# Patient Record
Sex: FEMALE | Race: WHITE | NOT HISPANIC OR LATINO
[De-identification: names, ages, dates, MRNs, and addresses within clinical notes are randomized per-mention and may not be internally consistent; named-entity substitution may affect disease eponyms.]

---

## 2020-01-03 ENCOUNTER — LABORATORY RESULT (OUTPATIENT)
Age: 85
End: 2020-01-03

## 2020-01-03 ENCOUNTER — APPOINTMENT (OUTPATIENT)
Dept: INTERNAL MEDICINE | Facility: CLINIC | Age: 85
End: 2020-01-03
Payer: MEDICARE

## 2020-01-03 VITALS
BODY MASS INDEX: 25.15 KG/M2 | DIASTOLIC BLOOD PRESSURE: 58 MMHG | TEMPERATURE: 98 F | WEIGHT: 121.46 LBS | SYSTOLIC BLOOD PRESSURE: 114 MMHG | HEIGHT: 58.27 IN | OXYGEN SATURATION: 95 % | HEART RATE: 72 BPM

## 2020-01-03 DIAGNOSIS — Z23 ENCOUNTER FOR IMMUNIZATION: ICD-10-CM

## 2020-01-03 PROCEDURE — G0444 DEPRESSION SCREEN ANNUAL: CPT

## 2020-01-03 PROCEDURE — 90670 PCV13 VACCINE IM: CPT

## 2020-01-03 PROCEDURE — G0009: CPT

## 2020-01-03 PROCEDURE — G0439: CPT | Mod: 25

## 2020-01-03 PROCEDURE — 99202 OFFICE O/P NEW SF 15 MIN: CPT | Mod: 25

## 2020-01-03 NOTE — HEALTH RISK ASSESSMENT
[Good] : ~his/her~  mood as  good [FreeTextEntry1] : Overall health maintenanc3 [] : No [No] : No [No falls in past year] : Patient reported no falls in the past year [0] : 2) Feeling down, depressed, or hopeless: Not at all (0) [de-identified] : none [de-identified] : none [Change in mental status noted] : No change in mental status noted [Language] : denies difficulty with language [Behavior] : denies difficulty with behavior [Learning/Retaining New Information] : denies difficulty learning/retaining new information [Handling Complex Tasks] : denies difficulty handling complex tasks [Reasoning] : denies difficulty with reasoning [Spatial Ability and Orientation] : denies difficulty with spatial ability and orientation [None] : None [Alone] : lives alone [] :  [Feels Safe at Home] : Feels safe at home [Fully functional (bathing, dressing, toileting, transferring, walking, feeding)] : Fully functional (bathing, dressing, toileting, transferring, walking, feeding) [Fully functional (using the telephone, shopping, preparing meals, housekeeping, doing laundry, using] : Fully functional and needs no help or supervision to perform IADLs (using the telephone, shopping, preparing meals, housekeeping, doing laundry, using transportation, managing medications and managing finances) [Reports changes in vision] : Reports no changes in vision [Reports changes in hearing] : Reports changes in hearing [Reports normal functional visual acuity (ie: able to read med bottle)] : Reports normal functional visual acuity [Reports changes in dental health] : Reports no changes in dental health [Smoke Detector] : smoke detector [Carbon Monoxide Detector] : carbon monoxide detector [Guns at Home] : guns at home [Safety elements used in home] : safety elements used in home [Seat Belt] :  uses seat belt [Sunscreen] : uses sunscreen [Travel to Developing Areas] : does not  travel to developing areas [TB Exposure] : is not being exposed to tuberculosis [Caregiver Concerns] : does not have caregiver concerns [MammogramComments] : n/a [PapSmearComments] : n/a [BoneDensityComments] : n/a [ColonoscopyComments] : n/a [de-identified] : Works at [employer] [de-identified] : Has seen ent [Designated Healthcare Proxy] : Designated healthcare proxy [AdvancecareDate] : 1/2020

## 2020-01-03 NOTE — REASON FOR VISIT
[Annual Wellness Visit] : an annual wellness visit [FreeTextEntry1] : Subsequent medicare wellness visit

## 2020-01-03 NOTE — HISTORY OF PRESENT ILLNESS
[FreeTextEntry1] : 89 year old female presents for subsequent medicare wellness visit and chronic disease management [de-identified] : Patient overall feels well, has chronic shoulder arthritis, saw orthopedist and did PT in the past. HTN, well controlled, had pneumovax 5 years ago. interested in prevnar vaccine. Denies any chest pain, tightness, shortness of breath or palpitations. Denies any falls.

## 2020-01-03 NOTE — ASSESSMENT
[FreeTextEntry1] : 1) Medicare annual wellness visit: blood work done in the office today, discussed fall risk precautions. Prevnar administered today\par 2) HTN: stable, continue current management\par 3) Shoulder arthritis: advised to continue with PT, improve strength and rom

## 2020-01-07 LAB
25(OH)D3 SERPL-MCNC: 44.4 NG/ML
ALBUMIN SERPL ELPH-MCNC: 4.7 G/DL
ALP BLD-CCNC: 67 U/L
ALT SERPL-CCNC: 55 U/L
ANION GAP SERPL CALC-SCNC: 14 MMOL/L
APPEARANCE: ABNORMAL
APPEARANCE: ABNORMAL
AST SERPL-CCNC: 41 U/L
BACTERIA: ABNORMAL
BASOPHILS # BLD AUTO: 0.08 K/UL
BASOPHILS NFR BLD AUTO: 1 %
BILIRUB SERPL-MCNC: 0.4 MG/DL
BILIRUBIN URINE: NEGATIVE
BILIRUBIN URINE: NEGATIVE
BLOOD URINE: NEGATIVE
BLOOD URINE: NEGATIVE
BUN SERPL-MCNC: 28 MG/DL
CALCIUM SERPL-MCNC: 9.9 MG/DL
CHLORIDE SERPL-SCNC: 101 MMOL/L
CHOLEST SERPL-MCNC: 225 MG/DL
CHOLEST/HDLC SERPL: 3.9 RATIO
CO2 SERPL-SCNC: 26 MMOL/L
COLOR: YELLOW
COLOR: YELLOW
CREAT SERPL-MCNC: 1.04 MG/DL
EOSINOPHIL # BLD AUTO: 0.14 K/UL
EOSINOPHIL NFR BLD AUTO: 1.7 %
ESTIMATED AVERAGE GLUCOSE: 108 MG/DL
GLUCOSE QUALITATIVE U: NEGATIVE
GLUCOSE QUALITATIVE U: NEGATIVE
GLUCOSE SERPL-MCNC: 113 MG/DL
HBA1C MFR BLD HPLC: 5.4 %
HCT VFR BLD CALC: 42.7 %
HDLC SERPL-MCNC: 58 MG/DL
HGB BLD-MCNC: 14 G/DL
HYALINE CASTS: 0 /LPF
IMM GRANULOCYTES NFR BLD AUTO: 0.2 %
KETONES URINE: NEGATIVE
KETONES URINE: NEGATIVE
LDLC SERPL CALC-MCNC: 132 MG/DL
LEUKOCYTE ESTERASE URINE: ABNORMAL
LEUKOCYTE ESTERASE URINE: ABNORMAL
LYMPHOCYTES # BLD AUTO: 1.63 K/UL
LYMPHOCYTES NFR BLD AUTO: 19.7 %
MAN DIFF?: NORMAL
MCHC RBC-ENTMCNC: 31.1 PG
MCHC RBC-ENTMCNC: 32.8 GM/DL
MCV RBC AUTO: 94.9 FL
MICROSCOPIC-UA: NORMAL
MONOCYTES # BLD AUTO: 0.55 K/UL
MONOCYTES NFR BLD AUTO: 6.6 %
NEUTROPHILS # BLD AUTO: 5.87 K/UL
NEUTROPHILS NFR BLD AUTO: 70.8 %
NITRITE URINE: POSITIVE
NITRITE URINE: POSITIVE
PH URINE: 6.5
PH URINE: 6.5
PLATELET # BLD AUTO: 291 K/UL
POTASSIUM SERPL-SCNC: 4.6 MMOL/L
PROT SERPL-MCNC: 6.8 G/DL
PROTEIN URINE: NEGATIVE
PROTEIN URINE: NEGATIVE
RBC # BLD: 4.5 M/UL
RBC # FLD: 13.1 %
RED BLOOD CELLS URINE: 3 /HPF
SODIUM SERPL-SCNC: 141 MMOL/L
SPECIFIC GRAVITY URINE: 1.01
SPECIFIC GRAVITY URINE: 1.01
SQUAMOUS EPITHELIAL CELLS: 2 /HPF
TRIGL SERPL-MCNC: 173 MG/DL
TSH SERPL-ACNC: 3.26 UIU/ML
UROBILINOGEN URINE: NORMAL
UROBILINOGEN URINE: NORMAL
VIT B12 SERPL-MCNC: 630 PG/ML
WBC # FLD AUTO: 8.29 K/UL
WHITE BLOOD CELLS URINE: 134 /HPF

## 2020-01-08 ENCOUNTER — MESSAGE (OUTPATIENT)
Age: 85
End: 2020-01-08

## 2020-01-15 ENCOUNTER — APPOINTMENT (OUTPATIENT)
Dept: INTERNAL MEDICINE | Facility: CLINIC | Age: 85
End: 2020-01-15
Payer: MEDICARE

## 2020-01-15 PROCEDURE — 36415 COLL VENOUS BLD VENIPUNCTURE: CPT

## 2020-01-17 LAB
ALBUMIN SERPL ELPH-MCNC: 4.5 G/DL
ALP BLD-CCNC: 69 U/L
ALT SERPL-CCNC: 47 U/L
AST SERPL-CCNC: 41 U/L
BILIRUB DIRECT SERPL-MCNC: 0.2 MG/DL
BILIRUB INDIRECT SERPL-MCNC: 0.4 MG/DL
BILIRUB SERPL-MCNC: 0.6 MG/DL
FERRITIN SERPL-MCNC: 280 NG/ML
HAV IGM SER QL: NONREACTIVE
HBV CORE IGM SER QL: NONREACTIVE
HBV SURFACE AG SER QL: NONREACTIVE
HCV AB SER QL: NONREACTIVE
HCV S/CO RATIO: 0.1 S/CO
IRON SATN MFR SERPL: 25 %
IRON SERPL-MCNC: 79 UG/DL
MITOCHONDRIA AB SER IF-ACNC: NORMAL
PROT SERPL-MCNC: 6.5 G/DL
SMOOTH MUSCLE AB SER QL IF: NORMAL
TIBC SERPL-MCNC: 320 UG/DL
UIBC SERPL-MCNC: 241 UG/DL

## 2020-01-23 LAB
ANA PAT FLD IF-IMP: ABNORMAL
ANA SER IF-ACNC: ABNORMAL

## 2020-02-20 ENCOUNTER — INPATIENT (INPATIENT)
Facility: HOSPITAL | Age: 85
LOS: 5 days | Discharge: HOME | DRG: 563 | End: 2020-02-26
Attending: HOSPITALIST | Admitting: STUDENT IN AN ORGANIZED HEALTH CARE EDUCATION/TRAINING PROGRAM
Payer: MEDICARE

## 2020-02-20 VITALS
HEIGHT: 58 IN | HEART RATE: 93 BPM | WEIGHT: 128.09 LBS | OXYGEN SATURATION: 97 % | RESPIRATION RATE: 20 BRPM | TEMPERATURE: 98 F | DIASTOLIC BLOOD PRESSURE: 82 MMHG | SYSTOLIC BLOOD PRESSURE: 145 MMHG

## 2020-02-20 PROCEDURE — 73060 X-RAY EXAM OF HUMERUS: CPT | Mod: 26,RT

## 2020-02-20 PROCEDURE — 73030 X-RAY EXAM OF SHOULDER: CPT | Mod: 26,RT

## 2020-02-20 PROCEDURE — 73502 X-RAY EXAM HIP UNI 2-3 VIEWS: CPT | Mod: 26,RT

## 2020-02-20 PROCEDURE — 99285 EMERGENCY DEPT VISIT HI MDM: CPT

## 2020-02-20 PROCEDURE — 71045 X-RAY EXAM CHEST 1 VIEW: CPT | Mod: 26

## 2020-02-20 PROCEDURE — 73552 X-RAY EXAM OF FEMUR 2/>: CPT | Mod: 26,RT

## 2020-02-20 PROCEDURE — 93010 ELECTROCARDIOGRAM REPORT: CPT

## 2020-02-20 RX ORDER — ACETAMINOPHEN 500 MG
975 TABLET ORAL ONCE
Refills: 0 | Status: COMPLETED | OUTPATIENT
Start: 2020-02-20 | End: 2020-02-20

## 2020-02-20 RX ADMIN — Medication 975 MILLIGRAM(S): at 21:40

## 2020-02-20 RX ADMIN — Medication 975 MILLIGRAM(S): at 20:39

## 2020-02-20 NOTE — ED PROVIDER NOTE - LOCATION
no ttp to hip/thigh, full ROM of all joints, neurovasc intact right mid-humeral swelling w overlying ecchymosis and ttp; no other obvious deformities or ttp (to shoulder, elbow, forearm, wrist or hand; severe pain w/attempted flexion of arm at elbow, full ROM otherwise; neurovasc intact throughout

## 2020-02-20 NOTE — ED PROVIDER NOTE - ATTENDING CONTRIBUTION TO CARE
I performed a history and physical exam of the patient and discussed their management with the resident and /or advanced care provider. I reviewed the resident and /or ACP's note and agree with the documented findings and plan of care. My medical decision making and observations are found above.  awake alert, moving all 4

## 2020-02-20 NOTE — ED PROVIDER NOTE - PROGRESS NOTE DETAILS
Elizabeth Goldberger PGY-3: xray w comminuted, impacted proximal humerus fx. Ortho paloma, pt aware Elizabeth Goldberger PGY-3: no call back from ortho, repaged Elizabeth Goldberger PGY-3: seen by ortho who placed sling and recommend NWB w/ outpt Follow up - no acute surgical intervention. Pt, normally able to ambulate unassisted, is able to stand but is having difficulty moving due to pain/sling on right + chronic severe arthritis in LUE. Son expressing concern about pt going home w limited mobility. Will plan to admit to med

## 2020-02-20 NOTE — ED PROVIDER NOTE - SHIFT CHANGE DETAILS
Deepa Farias MD - Attending Physician: Mechanical fall, Humerus fracture. Awaiting Ortho eval for dispo planning

## 2020-02-20 NOTE — ED ADULT NURSE NOTE - OBJECTIVE STATEMENT
89 y/o female PMH HTN, mastectomy, hysterectomy presents to ED reporting R shoulder pain. Pt reports falling from chair onto R side. Pt denies LOC, hitting head or blood thinner usage. Pt denies dizziness or lightheadedness prior to fall. Pt also reports R hip pain, no lesions, lacerations or ecchymosis noted. Pt reports taking alleve prior to coming into ED. On exam, AOx3, speaking in complete sentences. Deformity noted to R arm under shoulder, no ecchymosis, lesions or lacerations. PERRL 3mm, able to move all extremities. Lung sounds CTA, NAD. Abdomen soft, non-tender, non-distended, normoactive bowel sounds. Pt denies fever/chills, CP, SOB, n/v/d, fever/chills. Family at bedside. Seen and evaluated by MD. Awaiting imaging at this time. 89 y/o female PMH HTN, mastectomy, hysterectomy presents to ED reporting R shoulder pain. Pt reports falling from chair onto R side. Pt denies LOC, hitting head or blood thinner usage. Pt denies dizziness or lightheadedness prior to fall. Pt also reports R hip pain, no lesions, lacerations or ecchymosis noted. Pt reports taking Alleve prior to coming into ED. On exam, AOx3, speaking in complete sentences. Deformity noted to R arm under shoulder, no ecchymosis, lesions or lacerations. +2 peripheral pulses, <2 seconds capillary refill. PERRL 3mm, able to move all extremities. Lung sounds CTA, NAD. Abdomen soft, non-tender, non-distended, normoactive bowel sounds. Pt denies fever/chills, CP, SOB, n/v/d, fever/chills. Family at bedside. Seen and evaluated by MD. Awaiting imaging at this time.

## 2020-02-20 NOTE — ED PROVIDER NOTE - OBJECTIVE STATEMENT
89f w hx breast ca s/p b/l mastectomy and (prophylactic) hysterectomy here today after fall at home. Pt slipped on the floor after leaving bathroom and fell onto her right side with subsequent difficulty getting up. Normally ambulates unassisted. Denies head trauma or LOC, not on a/c. C/o pain to right shoulder/arm and right side of pelvis. Denies neck pain, back pain, or lower leg pain. No preceding cp sob or lightheadedness, no subsequent h/a, visual changes or vomiting. Feeling well recently w/o fevers, urinary sx, n/v/d or URI sx. 89f w hx breast ca s/p b/l mastectomy and (prophylactic) hysterectomy here today after fall at home. Pt slipped on the floor after leaving bathroom and fell onto her right side with subsequent difficulty getting up. Normally ambulates unassisted. Denies head trauma or LOC, not on a/c. C/o pain to right shoulder/arm and right side of pelvis. Denies neck pain, back pain, or lower leg pain. No preceding cp sob or lightheadedness, no subsequent h/a, visual changes or vomiting. Feeling well recently w/o fevers, urinary sx, n/v/d or URI sx.    PMD-Mick Walker

## 2020-02-20 NOTE — ED PROVIDER NOTE - CARE PLAN
Principal Discharge DX:	Closed fracture of proximal end of right humerus, unspecified fracture morphology, initial encounter  Secondary Diagnosis:	Fall

## 2020-02-20 NOTE — ED ADULT NURSE NOTE - NSIMPLEMENTINTERV_GEN_ALL_ED
Implemented All Fall with Harm Risk Interventions:  [place] to call system. Call bell, personal items and telephone within reach. Instruct patient to call for assistance. Room bathroom lighting operational. Non-slip footwear when patient is off stretcher. Physically safe environment: no spills, clutter or unnecessary equipment. Stretcher in lowest position, wheels locked, appropriate side rails in place. Provide visual cue, wrist band, yellow gown, etc. Monitor gait and stability. Monitor for mental status changes and reorient to person, place, and time. Review medications for side effects contributing to fall risk. Reinforce activity limits and safety measures with patient and family. Provide visual clues: red socks.

## 2020-02-20 NOTE — ED PROVIDER NOTE - CLINICAL SUMMARY MEDICAL DECISION MAKING FREE TEXT BOX
89f c/o right arm and pelvis pain after mechanical fall w/o head trauma or preceding sx. Pt appears well w normal VS. Exam w/ bruising to right mid-humerus and associated ttp, stable pelvis with full ROM of lower extremity. Neuro intact. No other obvious signs trauma. Plan for xrays, pain ctrl and reeval 89f c/o right arm and pelvis pain after mechanical fall w/o head trauma or preceding sx. Pt appears well w normal VS. Exam w/ bruising to right mid-humerus and associated ttp, stable pelvis with full ROM of lower extremity. Neuro intact. No other obvious signs trauma. Plan for xrays, pain ctrl and reeval  Farrukh: fall at home, unwitnessed, +bruising and pain. denies head trauma. stated she did not pass out. nl neuro exam. Not on blood thinners. Patient lives alone. Will get xrays of painful areas and treat pain

## 2020-02-21 DIAGNOSIS — Z02.9 ENCOUNTER FOR ADMINISTRATIVE EXAMINATIONS, UNSPECIFIED: ICD-10-CM

## 2020-02-21 DIAGNOSIS — R74.0 NONSPECIFIC ELEVATION OF LEVELS OF TRANSAMINASE AND LACTIC ACID DEHYDROGENASE [LDH]: ICD-10-CM

## 2020-02-21 DIAGNOSIS — Z79.899 OTHER LONG TERM (CURRENT) DRUG THERAPY: ICD-10-CM

## 2020-02-21 DIAGNOSIS — Z90.710 ACQUIRED ABSENCE OF BOTH CERVIX AND UTERUS: Chronic | ICD-10-CM

## 2020-02-21 DIAGNOSIS — I10 ESSENTIAL (PRIMARY) HYPERTENSION: ICD-10-CM

## 2020-02-21 DIAGNOSIS — S42.309A UNSPECIFIED FRACTURE OF SHAFT OF HUMERUS, UNSPECIFIED ARM, INITIAL ENCOUNTER FOR CLOSED FRACTURE: ICD-10-CM

## 2020-02-21 DIAGNOSIS — Z90.13 ACQUIRED ABSENCE OF BILATERAL BREASTS AND NIPPLES: Chronic | ICD-10-CM

## 2020-02-21 DIAGNOSIS — Z29.9 ENCOUNTER FOR PROPHYLACTIC MEASURES, UNSPECIFIED: ICD-10-CM
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
## 2020-02-21 DIAGNOSIS — S42.201A UNSPECIFIED FRACTURE OF UPPER END OF RIGHT HUMERUS, INITIAL ENCOUNTER FOR CLOSED FRACTURE: ICD-10-CM

## 2020-02-21 DIAGNOSIS — R65.10 SYSTEMIC INFLAMMATORY RESPONSE SYNDROME (SIRS) OF NON-INFECTIOUS ORIGIN WITHOUT ACUTE ORGAN DYSFUNCTION: ICD-10-CM

## 2020-02-21 LAB
ALBUMIN SERPL ELPH-MCNC: 3.7 G/DL — SIGNIFICANT CHANGE UP (ref 3.3–5)
ALBUMIN SERPL ELPH-MCNC: 3.9 G/DL — SIGNIFICANT CHANGE UP (ref 3.3–5)
ALP SERPL-CCNC: 62 U/L — SIGNIFICANT CHANGE UP (ref 40–120)
ALP SERPL-CCNC: 72 U/L — SIGNIFICANT CHANGE UP (ref 40–120)
ALT FLD-CCNC: 80 U/L — HIGH (ref 10–45)
ALT FLD-CCNC: 93 U/L — HIGH (ref 10–45)
ANION GAP SERPL CALC-SCNC: 14 MMOL/L — SIGNIFICANT CHANGE UP (ref 5–17)
ANION GAP SERPL CALC-SCNC: 15 MMOL/L — SIGNIFICANT CHANGE UP (ref 5–17)
APTT BLD: 26.4 SEC — LOW (ref 27.5–36.3)
AST SERPL-CCNC: 60 U/L — HIGH (ref 10–40)
AST SERPL-CCNC: 81 U/L — HIGH (ref 10–40)
BASOPHILS # BLD AUTO: 0.02 K/UL — SIGNIFICANT CHANGE UP (ref 0–0.2)
BASOPHILS # BLD AUTO: 0.03 K/UL — SIGNIFICANT CHANGE UP (ref 0–0.2)
BASOPHILS NFR BLD AUTO: 0.2 % — SIGNIFICANT CHANGE UP (ref 0–2)
BASOPHILS NFR BLD AUTO: 0.2 % — SIGNIFICANT CHANGE UP (ref 0–2)
BILIRUB SERPL-MCNC: 0.3 MG/DL — SIGNIFICANT CHANGE UP (ref 0.2–1.2)
BILIRUB SERPL-MCNC: 0.5 MG/DL — SIGNIFICANT CHANGE UP (ref 0.2–1.2)
BLD GP AB SCN SERPL QL: NEGATIVE — SIGNIFICANT CHANGE UP
BUN SERPL-MCNC: 33 MG/DL — HIGH (ref 7–23)
BUN SERPL-MCNC: 35 MG/DL — HIGH (ref 7–23)
CALCIUM SERPL-MCNC: 9.3 MG/DL — SIGNIFICANT CHANGE UP (ref 8.4–10.5)
CALCIUM SERPL-MCNC: 9.8 MG/DL — SIGNIFICANT CHANGE UP (ref 8.4–10.5)
CHLORIDE SERPL-SCNC: 98 MMOL/L — SIGNIFICANT CHANGE UP (ref 96–108)
CHLORIDE SERPL-SCNC: 99 MMOL/L — SIGNIFICANT CHANGE UP (ref 96–108)
CO2 SERPL-SCNC: 24 MMOL/L — SIGNIFICANT CHANGE UP (ref 22–31)
CO2 SERPL-SCNC: 26 MMOL/L — SIGNIFICANT CHANGE UP (ref 22–31)
CREAT SERPL-MCNC: 0.95 MG/DL — SIGNIFICANT CHANGE UP (ref 0.5–1.3)
CREAT SERPL-MCNC: 0.98 MG/DL — SIGNIFICANT CHANGE UP (ref 0.5–1.3)
EOSINOPHIL # BLD AUTO: 0 K/UL — SIGNIFICANT CHANGE UP (ref 0–0.5)
EOSINOPHIL # BLD AUTO: 0.02 K/UL — SIGNIFICANT CHANGE UP (ref 0–0.5)
EOSINOPHIL NFR BLD AUTO: 0 % — SIGNIFICANT CHANGE UP (ref 0–6)
EOSINOPHIL NFR BLD AUTO: 0.2 % — SIGNIFICANT CHANGE UP (ref 0–6)
GLUCOSE SERPL-MCNC: 131 MG/DL — HIGH (ref 70–99)
GLUCOSE SERPL-MCNC: 146 MG/DL — HIGH (ref 70–99)
HAV IGM SER-ACNC: SIGNIFICANT CHANGE UP
HBV CORE IGM SER-ACNC: SIGNIFICANT CHANGE UP
HBV SURFACE AG SER-ACNC: SIGNIFICANT CHANGE UP
HCT VFR BLD CALC: 34.4 % — LOW (ref 34.5–45)
HCT VFR BLD CALC: 36.5 % — SIGNIFICANT CHANGE UP (ref 34.5–45)
HCV AB S/CO SERPL IA: 0.13 S/CO — SIGNIFICANT CHANGE UP (ref 0–0.99)
HCV AB SERPL-IMP: SIGNIFICANT CHANGE UP
HGB BLD-MCNC: 11.6 G/DL — SIGNIFICANT CHANGE UP (ref 11.5–15.5)
HGB BLD-MCNC: 12.2 G/DL — SIGNIFICANT CHANGE UP (ref 11.5–15.5)
IMM GRANULOCYTES NFR BLD AUTO: 0.5 % — SIGNIFICANT CHANGE UP (ref 0–1.5)
IMM GRANULOCYTES NFR BLD AUTO: 0.7 % — SIGNIFICANT CHANGE UP (ref 0–1.5)
INR BLD: 1.03 RATIO — SIGNIFICANT CHANGE UP (ref 0.88–1.16)
LYMPHOCYTES # BLD AUTO: 1.14 K/UL — SIGNIFICANT CHANGE UP (ref 1–3.3)
LYMPHOCYTES # BLD AUTO: 1.18 K/UL — SIGNIFICANT CHANGE UP (ref 1–3.3)
LYMPHOCYTES # BLD AUTO: 11.6 % — LOW (ref 13–44)
LYMPHOCYTES # BLD AUTO: 8 % — LOW (ref 13–44)
MAGNESIUM SERPL-MCNC: 2.3 MG/DL — SIGNIFICANT CHANGE UP (ref 1.6–2.6)
MCHC RBC-ENTMCNC: 30.4 PG — SIGNIFICANT CHANGE UP (ref 27–34)
MCHC RBC-ENTMCNC: 30.8 PG — SIGNIFICANT CHANGE UP (ref 27–34)
MCHC RBC-ENTMCNC: 33.4 GM/DL — SIGNIFICANT CHANGE UP (ref 32–36)
MCHC RBC-ENTMCNC: 33.7 GM/DL — SIGNIFICANT CHANGE UP (ref 32–36)
MCV RBC AUTO: 91 FL — SIGNIFICANT CHANGE UP (ref 80–100)
MCV RBC AUTO: 91.2 FL — SIGNIFICANT CHANGE UP (ref 80–100)
MONOCYTES # BLD AUTO: 0.58 K/UL — SIGNIFICANT CHANGE UP (ref 0–0.9)
MONOCYTES # BLD AUTO: 0.63 K/UL — SIGNIFICANT CHANGE UP (ref 0–0.9)
MONOCYTES NFR BLD AUTO: 4.1 % — SIGNIFICANT CHANGE UP (ref 2–14)
MONOCYTES NFR BLD AUTO: 6.2 % — SIGNIFICANT CHANGE UP (ref 2–14)
NEUTROPHILS # BLD AUTO: 12.45 K/UL — HIGH (ref 1.8–7.4)
NEUTROPHILS # BLD AUTO: 8.26 K/UL — HIGH (ref 1.8–7.4)
NEUTROPHILS NFR BLD AUTO: 81.3 % — HIGH (ref 43–77)
NEUTROPHILS NFR BLD AUTO: 87 % — HIGH (ref 43–77)
NRBC # BLD: 0 /100 WBCS — SIGNIFICANT CHANGE UP (ref 0–0)
NRBC # BLD: 0 /100 WBCS — SIGNIFICANT CHANGE UP (ref 0–0)
PLATELET # BLD AUTO: 205 K/UL — SIGNIFICANT CHANGE UP (ref 150–400)
PLATELET # BLD AUTO: 209 K/UL — SIGNIFICANT CHANGE UP (ref 150–400)
POTASSIUM SERPL-MCNC: 4.1 MMOL/L — SIGNIFICANT CHANGE UP (ref 3.5–5.3)
POTASSIUM SERPL-MCNC: 4.8 MMOL/L — SIGNIFICANT CHANGE UP (ref 3.5–5.3)
POTASSIUM SERPL-SCNC: 4.1 MMOL/L — SIGNIFICANT CHANGE UP (ref 3.5–5.3)
POTASSIUM SERPL-SCNC: 4.8 MMOL/L — SIGNIFICANT CHANGE UP (ref 3.5–5.3)
PROT SERPL-MCNC: 6.3 G/DL — SIGNIFICANT CHANGE UP (ref 6–8.3)
PROT SERPL-MCNC: 6.6 G/DL — SIGNIFICANT CHANGE UP (ref 6–8.3)
PROTHROM AB SERPL-ACNC: 11.9 SEC — SIGNIFICANT CHANGE UP (ref 10–12.9)
RAPID RVP RESULT: SIGNIFICANT CHANGE UP
RBC # BLD: 3.77 M/UL — LOW (ref 3.8–5.2)
RBC # BLD: 4.01 M/UL — SIGNIFICANT CHANGE UP (ref 3.8–5.2)
RBC # FLD: 12.8 % — SIGNIFICANT CHANGE UP (ref 10.3–14.5)
RBC # FLD: 12.9 % — SIGNIFICANT CHANGE UP (ref 10.3–14.5)
RH IG SCN BLD-IMP: NEGATIVE — SIGNIFICANT CHANGE UP
SODIUM SERPL-SCNC: 137 MMOL/L — SIGNIFICANT CHANGE UP (ref 135–145)
SODIUM SERPL-SCNC: 139 MMOL/L — SIGNIFICANT CHANGE UP (ref 135–145)
WBC # BLD: 10.16 K/UL — SIGNIFICANT CHANGE UP (ref 3.8–10.5)
WBC # BLD: 14.3 K/UL — HIGH (ref 3.8–10.5)
WBC # FLD AUTO: 10.16 K/UL — SIGNIFICANT CHANGE UP (ref 3.8–10.5)
WBC # FLD AUTO: 14.3 K/UL — HIGH (ref 3.8–10.5)

## 2020-02-21 PROCEDURE — 99223 1ST HOSP IP/OBS HIGH 75: CPT | Mod: GC

## 2020-02-21 PROCEDURE — 76705 ECHO EXAM OF ABDOMEN: CPT | Mod: 26,RT

## 2020-02-21 PROCEDURE — 72195 MRI PELVIS W/O DYE: CPT | Mod: 26

## 2020-02-21 PROCEDURE — 73020 X-RAY EXAM OF SHOULDER: CPT | Mod: 26,RT

## 2020-02-21 RX ORDER — ACETAMINOPHEN 500 MG
650 TABLET ORAL EVERY 8 HOURS
Refills: 0 | Status: DISCONTINUED | OUTPATIENT
Start: 2020-02-21 | End: 2020-02-26

## 2020-02-21 RX ORDER — CHONDROITIN SULFATE A SODIUM 100 %
2 POWDER (GRAM) MISCELLANEOUS
Qty: 0 | Refills: 0 | DISCHARGE

## 2020-02-21 RX ORDER — METOPROLOL TARTRATE 50 MG
50 TABLET ORAL ONCE
Refills: 0 | Status: COMPLETED | OUTPATIENT
Start: 2020-02-21 | End: 2020-02-21

## 2020-02-21 RX ORDER — CHLORHEXIDINE GLUCONATE 213 G/1000ML
1 SOLUTION TOPICAL DAILY
Refills: 0 | Status: DISCONTINUED | OUTPATIENT
Start: 2020-02-21 | End: 2020-02-26

## 2020-02-21 RX ORDER — METOPROLOL TARTRATE 50 MG
50 TABLET ORAL
Refills: 0 | Status: DISCONTINUED | OUTPATIENT
Start: 2020-02-21 | End: 2020-02-26

## 2020-02-21 RX ORDER — HEPARIN SODIUM 5000 [USP'U]/ML
5000 INJECTION INTRAVENOUS; SUBCUTANEOUS EVERY 12 HOURS
Refills: 0 | Status: DISCONTINUED | OUTPATIENT
Start: 2020-02-21 | End: 2020-02-26

## 2020-02-21 RX ADMIN — HEPARIN SODIUM 5000 UNIT(S): 5000 INJECTION INTRAVENOUS; SUBCUTANEOUS at 17:51

## 2020-02-21 RX ADMIN — Medication 50 MILLIGRAM(S): at 17:51

## 2020-02-21 RX ADMIN — Medication 650 MILLIGRAM(S): at 04:56

## 2020-02-21 RX ADMIN — Medication 50 MILLIGRAM(S): at 12:32

## 2020-02-21 RX ADMIN — HEPARIN SODIUM 5000 UNIT(S): 5000 INJECTION INTRAVENOUS; SUBCUTANEOUS at 06:24

## 2020-02-21 RX ADMIN — Medication 650 MILLIGRAM(S): at 12:32

## 2020-02-21 RX ADMIN — Medication 50 MILLIGRAM(S): at 03:01

## 2020-02-21 NOTE — H&P ADULT - PROBLEM SELECTOR PLAN 1
- patient with acute right proximal humeral fracture s/p fall  - per ortho: no acute surgical intervention, R sling, pain ctrl, WBAT, PT/OT  - plan for fall as below

## 2020-02-21 NOTE — OCCUPATIONAL THERAPY INITIAL EVALUATION ADULT - AMBULATORY DEVICES NEEDED
no/As per chart review, pt ambulates without AD no/As per chart review, pt ambulates without AD; drives in community

## 2020-02-21 NOTE — PHYSICAL THERAPY INITIAL EVALUATION ADULT - PERTINENT HX OF CURRENT PROBLEM, REHAB EVAL
Pt is a 89 y/o female admitted to [facility] on 2/21/20 PMH breast ca s/p b/l mastectomy and (prophylactic) hysterectomy, HTN here today after fall at her work office. Patient reports she slipped on the floor after leaving bathroom and fell onto her right side with subsequent difficulty getting up at her work office. She was found by a coworker. She Normally ambulates unassisted and lives alone, completes all iADLs independently.

## 2020-02-21 NOTE — PHYSICAL THERAPY INITIAL EVALUATION ADULT - PLANNED THERAPY INTERVENTIONS, PT EVAL
transfer training/bed mobility training/gait training/stair negotiation: GOAL: Pt will be able to negotiate 10 steps +HR independently with step to pattern in 2 weeks.

## 2020-02-21 NOTE — PHYSICAL THERAPY INITIAL EVALUATION ADULT - GAIT DEVIATIONS NOTED, PT EVAL
decreased weight-shifting ability/decreased audrey/decreased velocity of limb motion/decreased step length

## 2020-02-21 NOTE — PHYSICAL THERAPY INITIAL EVALUATION ADULT - ADDITIONAL COMMENTS
Pt lives alone in a private house with 6-7 steps to enter and one flight of steps to the basement. Pt was Ind with all ADLs, amb without AD.

## 2020-02-21 NOTE — H&P ADULT - NSHPREVIEWOFSYSTEMS_GEN_ALL_CORE
REVIEW OF SYSTEMS:    CONSTITUTIONAL: No weakness, fevers or chills. +imbalance  EYES/ENT: No visual changes;  No vertigo or throat pain   NECK: No pain or stiffness  RESPIRATORY: No cough, wheezing, hemoptysis; No shortness of breath  CARDIOVASCULAR: No chest pain or palpitations  GASTROINTESTINAL: No abdominal pain; nausea, vomiting;  diarrhea or constipation. No hemetemesis, melena or hematochezia.  GENITOURINARY: No dysuria, frequency or hematuria  NEUROLOGICAL: No numbness or weakness  SKIN: No itching, burning, rashes, or lesions

## 2020-02-21 NOTE — H&P ADULT - NSHPSOCIALHISTORY_GEN_ALL_CORE
prior smoking history 15 yrs 1PPD quit many years prior  denies alcohol or illicit drug use  works as a [employer]

## 2020-02-21 NOTE — OCCUPATIONAL THERAPY INITIAL EVALUATION ADULT - RANGE OF MOTION EXAMINATION, UPPER EXTREMITY
RUE not tested at shoulder, elbow/wrist/digits AROM WFL/Left UE Active ROM was WFL (within functional limits)

## 2020-02-21 NOTE — H&P ADULT - PROBLEM SELECTOR PLAN 4
- of uncertain etiology  - will check RUQ US, hep panel - of uncertain etiology  - will check RUQ US, hep panel  - check myoglobin CK in AM

## 2020-02-21 NOTE — CONSULT NOTE ADULT - SUBJECTIVE AND OBJECTIVE BOX
89yFemale c/o R shoulder pain  s/p MF on R side. Patient denies head hit or LOC. Patient denies numbness or tingling in the RUE. Patient denies any other injuries.      ROS: 10 point ROS otherwise negative    PMH:  Breast cancer    PSH:    AH:  Keflex (Hives)    Meds: See med rec    T(C): 36.4 (02-20-20 @ 20:25)  HR: 89 (02-20-20 @ 20:25)  BP: 120/77 (02-20-20 @ 20:25)  RR: 18 (02-20-20 @ 20:25)  SpO2: 97% (02-20-20 @ 20:25)  Wt(kg): --    Gen: NAD  Resp: Unlabored breathing  PE RUE:  Skin intact,    SILT axillary/med/rad/ulnar  +Motor AIN/PIN/Ulnar/Radial/Musc/Median,   +painless elbow/wrist ROM,   shoulder ROM limited 2/2 pain,   2+radial pulse, soft compartments.    Secondary:  No TTP over bony landmarks, SILT BL, ROM intact BL, distal pulses palpable.    Imaging:  XR demonstrating right proximal humerus fracture        89yFemale with R proximal humerus fracture  Pain control  NWB in sling  PT/OT  No acute orthopaedic intervention  Ortho to sign off  Please call if you have further question  Can follow up with Dr. Potter in 1-2 weeks or upon discharge    Ortho [phone] 89yFemale c/o R shoulder pain  s/p MF on R side. Patient denies head hit or LOC. Patient denies numbness or tingling in the RUE. Patient complains of difficulty standing and right lateral thigh pain, but denies groin pain.  Patient denies any other injuries.      ROS: 10 point ROS otherwise negative    PMH:  Breast cancer    PSH:    AH:  Keflex (Hives)    Meds: See med rec    T(C): 36.4 (02-20-20 @ 20:25)  HR: 89 (02-20-20 @ 20:25)  BP: 120/77 (02-20-20 @ 20:25)  RR: 18 (02-20-20 @ 20:25)  SpO2: 97% (02-20-20 @ 20:25)  Wt(kg): --    Gen: NAD  Resp: Unlabored breathing  PE RUE:  Skin intact,    SILT axillary/med/rad/ulnar  +Motor AIN/PIN/Ulnar/Radial/Musc/Median,   +painless elbow/wrist ROM,   shoulder ROM limited 2/2 pain,   2+radial pulse, soft compartments.      BLE: Skin intact, no ecchymosis,        SILT DP/SP/ [place]/Saph/tib       +EHL/FHL/TA/Gastroc,        DP+,        soft compartments, no calf ttp,        neg SLR, Log roll, heel strike         Secondary:  No TTP over bony landmarks, SILT BL, ROM intact BL, distal pulses palpable.    Imaging:  XR demonstrating right proximal humerus fracture    XRs R hip/femur/pelvis: No acute findings    89yFemale with R proximal humerus fracture  Pain control  NWB in sling  PT/OT  No acute orthopaedic intervention  Ortho to sign off  Please call if you have further question  Can follow up with Dr. Potter in 1-2 weeks or upon discharge    Ortho [phone]

## 2020-02-21 NOTE — H&P ADULT - ASSESSMENT
89F PMH breast ca s/p b/l mastectomy and (prophylactic) hysterectomy here today after fall at home. Pt slipped on the floor after leaving bathroom and fell onto her right side with subsequent difficulty getting up, found with Impacted, comminuted fracture of the proximal humerus 2/2 fall.

## 2020-02-21 NOTE — H&P ADULT - PROBLEM SELECTOR PLAN 5
- IMPROVE 3 2/2 age and poor mobility  - DVT: HSQ q 12  - Diet:   - Dispo: pending PT eval - c/w metoprolol 50 BID  - patient also on triamterene however BPs are at goal; monitor BPs and re-add PRN. If BPs acceptable on metoprolol would consider d/c'ing additional BP med given risk for hypotension inducing falls

## 2020-02-21 NOTE — OCCUPATIONAL THERAPY INITIAL EVALUATION ADULT - BALANCE TRAINING, PT EVAL
Goal: Patient will increase static/dynamic standing balance by 1 grade to facilitate increased safety, ability to perform ADLs and functional mobility within 4 weeks.

## 2020-02-21 NOTE — H&P ADULT - HISTORY OF PRESENT ILLNESS
89F PMH breast ca s/p b/l mastectomy and (prophylactic) hysterectomy here today after fall at home. Pt slipped on the floor after leaving bathroom and fell onto her right side with subsequent difficulty getting up. Normally ambulates unassisted. Denies head trauma or LOC, not on a/c. C/o pain to right shoulder/arm and right side of pelvis. Denies neck pain, back pain, or lower leg pain. No preceding cp sob or lightheadedness, no subsequent h/a, visual changes or vomiting. Feeling well recently w/o fevers, urinary sx, n/v/d or URI sx.    In the ED:  - VS:Tm 97.8, tachy 103, -145/68-82, RR 18-20, O2 96-97 on RA  - Pertinent Labs: WBC 14.3, BUN 35, Cr 0.98, , AST 81, ALT 93  - IMG: CXR: clear lungs  - IMG: R humerus XR - Impacted, comminuted fracture of the proximal humerus.  - IMG: R hip XR: No acute fracture or dislocation. Degenerative changes of the bilateral hips, left greater than right, and the visualized lobar spine. Tricompartmental osteoarthritis of the right knee with severe medial tibiofemoral joint space narrowing.   - patient received: Tylenol 975 x1 89F PMH breast ca s/p b/l mastectomy and (prophylactic) hysterectomy, HTN here today after fall at her work office. Patient reports she slipped on the floor after leaving bathroom and fell onto her right side with subsequent difficulty getting up at her work office. She was found by a coworker. She Normally ambulates unassisted and lives alone, completes all iADLs independently. She denies head trauma or LOC, not on a/c. C/o pain to right shoulder/arm and right side of pelvis. She reports she has had progressive imbalance over the past 6 months. 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 She reports muscle aches at night. She has chronic increased urinary frequency with nocturia. She reports recent cold symptoms one week prior (rhinorrhea, no fever, no chills, no N/V, some muscle aches). Reports coworkers were sick and she likely caught it from them; symptoms have been improving. She reports no other recent falls. No F/N/V/chills,chest pain, SOB, cough, sore throat, abdominal pain, diarrhea, constipation.     In the ED:  - VS:Tm 97.8, tachy 103, -145/68-82, RR 18-20, O2 96-97 on RA  - Pertinent Labs: WBC 14.3, BUN 35, Cr 0.98, , AST 81, ALT 93  - IMG: CXR: clear lungs  - IMG: R humerus XR - Impacted, comminuted fracture of the proximal humerus.  - IMG: R hip XR: No acute fracture or dislocation. Degenerative changes of the bilateral hips, left greater than right, and the visualized lobar spine. Tricompartmental osteoarthritis of the right knee with severe medial tibiofemoral joint space narrowing.   - patient received: Tylenol 975 x1

## 2020-02-21 NOTE — H&P ADULT - NSHPLABSRESULTS_GEN_ALL_CORE
CBC Full  -  ( 21 Feb 2020 00:42 )  WBC Count : 14.30 K/uL  Hemoglobin : 12.2 g/dL  Hematocrit : 36.5 %  Platelet Count - Automated : 205 K/uL  Mean Cell Volume : 91.0 fl  Mean Cell Hemoglobin : 30.4 pg  Mean Cell Hemoglobin Concentration : 33.4 gm/dL  Auto Neutrophil # : 12.45 K/uL  Auto Lymphocyte # : 1.14 K/uL  Auto Monocyte # : 0.58 K/uL  Auto Eosinophil # : 0.00 K/uL  Auto Basophil # : 0.03 K/uL  Auto Neutrophil % : 87.0 %  Auto Lymphocyte % : 8.0 %  Auto Monocyte % : 4.1 %  Auto Eosinophil % : 0.0 %  Auto Basophil % : 0.2 %    02-21    137  |  98  |  35<H>  ----------------------------<  146<H>  4.8   |  24  |  0.98    Ca    9.8      21 Feb 2020 00:42    TPro  6.6  /  Alb  3.9  /  TBili  0.3  /  DBili  x   /  AST  81<H>  /  ALT  93<H>  /  AlkPhos  72  02-21    LIVER FUNCTIONS - ( 21 Feb 2020 00:42 )  Alb: 3.9 g/dL / Pro: 6.6 g/dL / ALK PHOS: 72 U/L / ALT: 93 U/L / AST: 81 U/L / GGT: x             PT/INR - ( 21 Feb 2020 00:42 )   PT: 11.9 sec;   INR: 1.03 ratio         PTT - ( 21 Feb 2020 00:42 )  PTT:26.4 sec          EKG:       Imaging:    < from: Xray Humerus, Right (02.20.20 @ 21:31) >    INTERPRETATION: Impacted, comminuted fracture of the proximal humerus.  < from: Xray Hip w/ Pelvis 2 or 3 Views, Right (02.20.20 @ 21:22) >    No acute fracture or dislocation. Degenerative changes of the bilateral hips, left greater than right, and the visualized lobar spine. Tricompartmental osteoarthritis of the right knee with severe medial tibiofemoral joint space narrowing.     IMPRESSION:    No acute fracture.    < end of copied text >

## 2020-02-21 NOTE — PHYSICAL THERAPY INITIAL EVALUATION ADULT - PRECAUTIONS/LIMITATIONS, REHAB EVAL
She denies head trauma or LOC, not on a/c. C/o pain to right shoulder/arm and right side of pelvis. She reports she has had progressive imbalance over the past 6 months. 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 She reports muscle aches at night. She has chronic increased urinary frequency with nocturia. (-) XR pelvis, (-) XR femur,  XR R shoulder: Proximal humeral fracture

## 2020-02-21 NOTE — H&P ADULT - PROBLEM SELECTOR PLAN 6
- Dispo: pending PT eval  - PCP is Mick Walker - full med rec in AM, patient cannot recall dosing of triamterene

## 2020-02-21 NOTE — PHYSICAL THERAPY INITIAL EVALUATION ADULT - RANGE OF MOTION EXAMINATION, REHAB EVAL
deficits as listed below/R shoulder NT, L shoulder flexion/abduction 90/no ROM deficits were identified

## 2020-02-21 NOTE — CHART NOTE - NSCHARTNOTEFT_GEN_A_CORE
Patient was seen and examined by me at bedside. I agree with H and P , subjective, objective physical exam, assessment and plan with following modifications/additions.     Patient with continuous hip pain.  Underlying concern for fracture.. Will obtain MRI w/o to evaluate.  PT recommends KODY

## 2020-02-21 NOTE — OCCUPATIONAL THERAPY INITIAL EVALUATION ADULT - ADL RETRAINING, OT EVAL
Goal: Pt will perform UB/LB dressing independently using compensatory dressing technique within 4 weeks. Goal: Patient will perform grooming standing sink level independently in 4 weeks

## 2020-02-21 NOTE — PHYSICAL THERAPY INITIAL EVALUATION ADULT - PASSIVE RANGE OF MOTION EXAMINATION, REHAB EVAL
no Passive ROM deficits were identified/deficits as listed below/R shoulder NT. L shoulder flexion/abduction 90

## 2020-02-21 NOTE — PHYSICAL THERAPY INITIAL EVALUATION ADULT - ACTIVE RANGE OF MOTION EXAMINATION, REHAB EVAL
R shoulder NT, L shoulder flexion/abduction 90/deficits as listed below/no Active ROM deficits were identified

## 2020-02-21 NOTE — H&P ADULT - PROBLEM SELECTOR PLAN 2
- s/p mechanical fall likely 2/2 age  - no head injury or LOC  - fall precs  - PT eval - s/p mechanical fall likely 2/2 age, arthritis, ?recent URI?  - check orthostatics  - no head injury or LOC  - fall precs  - PT eval - s/p mechanical fall likely 2/2 age, arthritis, ?recent URI?  - check orthostatics  - no head injury or LOC  - fall precs  - patient declined CT head at this time; given imbalance and fall, would obtain CT head if patient agreeable. Re-evaluate in AM   - PT eval

## 2020-02-21 NOTE — OCCUPATIONAL THERAPY INITIAL EVALUATION ADULT - PRECAUTIONS/LIMITATIONS, REHAB EVAL
C/o pain to right shoulder/arm and right side of pelvis. She reports she has had progressive imbalance over the past 6 mos. Pt reports she has chronic hip arthritis and left shoulder arthritis./fall precautions

## 2020-02-21 NOTE — PHYSICAL THERAPY INITIAL EVALUATION ADULT - LEVEL OF INDEPENDENCE: GAIT, REHAB EVAL
moderate assist (50% patients effort)

## 2020-02-21 NOTE — OCCUPATIONAL THERAPY INITIAL EVALUATION ADULT - DIAGNOSIS, OT EVAL
Pt currently presents with decreased AROM, balance and strength limiting independence with ADLs and functional mobility.

## 2020-02-21 NOTE — H&P ADULT - ATTENDING COMMENTS
89F w/ PMH breast ca s/p b/l mastectomy and (prophylactic) hysterectomy  presents post  fall , sustained Trauma to R arm , no LOC , no head trauma , imaging w/ comminuted fracture of the R proximal humerus, no fractures on LE however difficulty bearing weight, will consult PT to assess function ,  if still unable to ambulate would obtain CT hip or MRI to r/o occult fracture

## 2020-02-21 NOTE — H&P ADULT - PROBLEM SELECTOR PLAN 3
- patient with tachycardia and mild leukocytosis to 14s  - suspect leukocytosis reactive 2/2 trauma, tachycardia 2/2 pain  - monitor WBC and HR for now - patient with tachycardia and mild leukocytosis to 14s  - suspect leukocytosis reactive 2/2 trauma, tachycardia 2/2 pain  - monitor WBC and HR for now  - will check RVP

## 2020-02-21 NOTE — H&P ADULT - NSHPPHYSICALEXAM_GEN_ALL_CORE
PHYSICAL EXAM:    Vital Signs Last 24 Hrs  T(C): 36.6 (21 Feb 2020 01:34), Max: 36.6 (21 Feb 2020 01:34)  T(F): 97.8 (21 Feb 2020 01:34), Max: 97.8 (21 Feb 2020 01:34)  HR: 103 (21 Feb 2020 01:34) (89 - 103)  BP: 118/68 (21 Feb 2020 01:34) (118/68 - 145/82)  BP(mean): --  RR: 18 (21 Feb 2020 01:34) (18 - 20)  SpO2: 96% (21 Feb 2020 01:34) (96% - 97%)    General: No acute distress.  HEENT: NCAT.  PERRL.  EOMI.  No scleral icterus or injection.  Moist MM.  No oropharyngeal exudates.    Neck: Supple.  Full ROM.  No JVD.  No thyromegaly. No lymphadenopathy.   Heart: RRR.  Normal S1 and S2.  No murmurs, rubs, or gallops.   Lungs: CTAB. No wheezes, crackles, or rhonchi.    Abdomen: BS+, soft, NT/ND.  No organomegaly.  Skin: Warm and dry.  No rashes.  Extremities: right arm in sling. Mild TTP of right shoulder. Left arm with restriction of full ROM above 45 degrees above left shoulder. Right hip nontender. full active and passive ROM of right and left hip, right hip slightly weaker than left  Musculoskeletal: No deformities.  No spinal or paraspinal tenderness.  Neuro: A&Ox3.  CN II-XII intact.

## 2020-02-21 NOTE — ED ADULT NURSE REASSESSMENT NOTE - NS ED NURSE REASSESS COMMENT FT1
Pt admitted to medicine, diagnosis of R humerus fracture and fall. Awaiting bed placement at this time, pt and family aware of plan of care.

## 2020-02-21 NOTE — OCCUPATIONAL THERAPY INITIAL EVALUATION ADULT - MD ORDER
OT Evaluate and Treat  s/p fall  RUE NWB with HANK long OT Evaluate and Treat  s/p fall  RUE NWB in sling

## 2020-02-21 NOTE — OCCUPATIONAL THERAPY INITIAL EVALUATION ADULT - PERTINENT HX OF CURRENT PROBLEM, REHAB EVAL
89yo F PMH breast ca s/p b/l mastectomy and (prophylactic) hysterectomy, HTN here today after fall at her work office. Pt reports she slipped on floor after leaving bathroom and fell onto her right side with subsequent difficulty getting up at her work office. Pt found by coworker.

## 2020-02-22 DIAGNOSIS — S32.9XXA FRACTURE OF UNSPECIFIED PARTS OF LUMBOSACRAL SPINE AND PELVIS, INITIAL ENCOUNTER FOR CLOSED FRACTURE: ICD-10-CM

## 2020-02-22 LAB
ALBUMIN SERPL ELPH-MCNC: 3.7 G/DL — SIGNIFICANT CHANGE UP (ref 3.3–5)
ALP SERPL-CCNC: 57 U/L — SIGNIFICANT CHANGE UP (ref 40–120)
ALT FLD-CCNC: 55 U/L — HIGH (ref 10–45)
ANA TITR SER: NEGATIVE — SIGNIFICANT CHANGE UP
ANION GAP SERPL CALC-SCNC: 10 MMOL/L — SIGNIFICANT CHANGE UP (ref 5–17)
AST SERPL-CCNC: 35 U/L — SIGNIFICANT CHANGE UP (ref 10–40)
BILIRUB SERPL-MCNC: 0.5 MG/DL — SIGNIFICANT CHANGE UP (ref 0.2–1.2)
BUN SERPL-MCNC: 28 MG/DL — HIGH (ref 7–23)
CALCIUM SERPL-MCNC: 9 MG/DL — SIGNIFICANT CHANGE UP (ref 8.4–10.5)
CHLORIDE SERPL-SCNC: 101 MMOL/L — SIGNIFICANT CHANGE UP (ref 96–108)
CO2 SERPL-SCNC: 26 MMOL/L — SIGNIFICANT CHANGE UP (ref 22–31)
CREAT SERPL-MCNC: 0.86 MG/DL — SIGNIFICANT CHANGE UP (ref 0.5–1.3)
GLUCOSE SERPL-MCNC: 111 MG/DL — HIGH (ref 70–99)
POTASSIUM SERPL-MCNC: 3.6 MMOL/L — SIGNIFICANT CHANGE UP (ref 3.5–5.3)
POTASSIUM SERPL-SCNC: 3.6 MMOL/L — SIGNIFICANT CHANGE UP (ref 3.5–5.3)
PROT SERPL-MCNC: 6 G/DL — SIGNIFICANT CHANGE UP (ref 6–8.3)
SODIUM SERPL-SCNC: 137 MMOL/L — SIGNIFICANT CHANGE UP (ref 135–145)

## 2020-02-22 PROCEDURE — 76377 3D RENDER W/INTRP POSTPROCES: CPT | Mod: 26

## 2020-02-22 PROCEDURE — 72192 CT PELVIS W/O DYE: CPT | Mod: 26

## 2020-02-22 PROCEDURE — 99233 SBSQ HOSP IP/OBS HIGH 50: CPT

## 2020-02-22 PROCEDURE — 72190 X-RAY EXAM OF PELVIS: CPT | Mod: 26

## 2020-02-22 RX ORDER — LIDOCAINE 4 G/100G
1 CREAM TOPICAL EVERY 24 HOURS
Refills: 0 | Status: DISCONTINUED | OUTPATIENT
Start: 2020-02-22 | End: 2020-02-26

## 2020-02-22 RX ORDER — LIDOCAINE 4 G/100G
1 CREAM TOPICAL EVERY 24 HOURS
Refills: 0 | Status: DISCONTINUED | OUTPATIENT
Start: 2020-02-22 | End: 2020-02-22

## 2020-02-22 RX ORDER — LIDOCAINE 4 G/100G
1 CREAM TOPICAL DAILY
Refills: 0 | Status: DISCONTINUED | OUTPATIENT
Start: 2020-02-22 | End: 2020-02-26

## 2020-02-22 RX ADMIN — Medication 650 MILLIGRAM(S): at 09:30

## 2020-02-22 RX ADMIN — LIDOCAINE 1 PATCH: 4 CREAM TOPICAL at 15:38

## 2020-02-22 RX ADMIN — LIDOCAINE 1 PATCH: 4 CREAM TOPICAL at 20:01

## 2020-02-22 RX ADMIN — CHLORHEXIDINE GLUCONATE 1 APPLICATION(S): 213 SOLUTION TOPICAL at 12:49

## 2020-02-22 RX ADMIN — Medication 650 MILLIGRAM(S): at 01:53

## 2020-02-22 RX ADMIN — LIDOCAINE 1 PATCH: 4 CREAM TOPICAL at 12:49

## 2020-02-22 RX ADMIN — Medication 50 MILLIGRAM(S): at 19:46

## 2020-02-22 RX ADMIN — Medication 650 MILLIGRAM(S): at 08:40

## 2020-02-22 RX ADMIN — HEPARIN SODIUM 5000 UNIT(S): 5000 INJECTION INTRAVENOUS; SUBCUTANEOUS at 05:38

## 2020-02-22 RX ADMIN — Medication 650 MILLIGRAM(S): at 00:47

## 2020-02-22 RX ADMIN — Medication 50 MILLIGRAM(S): at 05:38

## 2020-02-22 RX ADMIN — HEPARIN SODIUM 5000 UNIT(S): 5000 INJECTION INTRAVENOUS; SUBCUTANEOUS at 19:46

## 2020-02-22 NOTE — PROGRESS NOTE ADULT - PROBLEM SELECTOR PLAN 6
- c/w metoprolol 50 BID  - patient also on triamterene however BPs are at goal; monitor BPs and re-add PRN. If BPs acceptable on metoprolol would consider d/c'ing additional BP med given risk for hypotension inducing falls

## 2020-02-22 NOTE — CHART NOTE - NSCHARTNOTEFT_GEN_A_CORE
Imaging reviewed. Patient noted to have Nondisplaced right superior pubic acetabular junction fracture, Nondisplaced right inferior pubic ramus fracture, Nondisplaced right sacral alar fracture, and Nondisplaced right posterior right iliac bone fracture on MRI Pelvis.     -WBAT BLLE  -NWB RUE in sling  -PT/OT  -No further orthopaedic surgical interventions.  -Follow up outpatient with Dr. Potter upon 5-7 days of discharge.  -Ortho stable    Mackenzie Willis M.D.  PGY-2 Orthopaedic Surgery

## 2020-02-22 NOTE — PROGRESS NOTE ADULT - PROBLEM SELECTOR PLAN 1
-MRI pelvis showing nondisplaced fractures of the right superior pubic acetabular junction, the right inferior pubic ramus fracture, the right sacral alar area, and the right posterior right iliac bone  -findings discussed with ortho, likely non-operative management as per ortho recs, however full recommendations to follow after they review imaging  -bed rest until cleared by ortho to bear weight  -pain management, will add on lidocaine patch

## 2020-02-22 NOTE — PROGRESS NOTE ADULT - PROBLEM SELECTOR PLAN 3
- s/p mechanical fall likely 2/2 age, arthritis, ?recent URI?  - orthostatics neg   - no head injury or LOC  - fall precautions   - PT eval once cleared by ortho as above

## 2020-02-22 NOTE — PROGRESS NOTE ADULT - SUBJECTIVE AND OBJECTIVE BOX
Patient is a 89y old  Female who presents with a chief complaint of fall, R humeral fracture (21 Feb 2020 02:33)    SUBJECTIVE / OVERNIGHT EVENTS: pt reports hip pain that makes it difficult for her to move and get out of bed. Still has R arm pain as well.     MEDICATIONS  (STANDING):  chlorhexidine 2% Cloths 1 Application(s) Topical daily  heparin  Injectable 5000 Unit(s) SubCutaneous every 12 hours  metoprolol tartrate 50 milliGRAM(s) Oral two times a day    MEDICATIONS  (PRN):  acetaminophen   Tablet .. 650 milliGRAM(s) Oral every 8 hours PRN Temp greater or equal to 38C (100.4F), Mild Pain (1 - 3), Moderate Pain (4 - 6)      Vital Signs Last 24 Hrs  T(C): 36.4 (22 Feb 2020 07:44), Max: 36.7 (21 Feb 2020 23:57)  T(F): 97.6 (22 Feb 2020 07:44), Max: 98 (21 Feb 2020 23:57)  HR: 75 (22 Feb 2020 07:44) (75 - 90)  BP: 112/61 (22 Feb 2020 07:44) (109/58 - 132/78)  BP(mean): --  RR: 18 (22 Feb 2020 07:44) (16 - 18)  SpO2: 98% (22 Feb 2020 07:44) (93% - 98%)  CAPILLARY BLOOD GLUCOSE      I&O's Summary      PHYSICAL EXAM:  GENERAL: NAD  EYES:  conjunctiva and sclera clear  NECK: Supple, No JVD  CHEST/LUNG: Clear to auscultation bilaterally; No wheeze  HEART: +s1/S2, reg   ABDOMEN: Soft, Nontender, Nondistended; Bowel sounds present  EXTREMITIES: no LE edema, RUE in sling   PSYCH: AAOx3      LABS:                        11.6   10.16 )-----------( 209      ( 21 Feb 2020 06:30 )             34.4     02-22    137  |  101  |  28<H>  ----------------------------<  111<H>  3.6   |  26  |  0.86    Ca    9.0      22 Feb 2020 07:09  Phos  4.1     02-21  Mg     2.3     02-21    TPro  6.0  /  Alb  3.7  /  TBili  0.5  /  DBili  x   /  AST  35  /  ALT  55<H>  /  AlkPhos  57  02-22    PT/INR - ( 21 Feb 2020 00:42 )   PT: 11.9 sec;   INR: 1.03 ratio         PTT - ( 21 Feb 2020 00:42 )  PTT:26.4 sec  CARDIAC MARKERS ( 21 Feb 2020 06:30 )  x     / x     / 68 U/L / x     / x              Care Discussed with Consultants/Other Providers: Ortho team regarding MRI results

## 2020-02-23 PROCEDURE — 99232 SBSQ HOSP IP/OBS MODERATE 35: CPT

## 2020-02-23 RX ADMIN — LIDOCAINE 1 PATCH: 4 CREAM TOPICAL at 19:49

## 2020-02-23 RX ADMIN — LIDOCAINE 1 PATCH: 4 CREAM TOPICAL at 23:23

## 2020-02-23 RX ADMIN — LIDOCAINE 1 PATCH: 4 CREAM TOPICAL at 15:03

## 2020-02-23 RX ADMIN — Medication 50 MILLIGRAM(S): at 17:46

## 2020-02-23 RX ADMIN — LIDOCAINE 1 PATCH: 4 CREAM TOPICAL at 03:28

## 2020-02-23 RX ADMIN — Medication 50 MILLIGRAM(S): at 05:24

## 2020-02-23 RX ADMIN — LIDOCAINE 1 PATCH: 4 CREAM TOPICAL at 11:58

## 2020-02-23 RX ADMIN — HEPARIN SODIUM 5000 UNIT(S): 5000 INJECTION INTRAVENOUS; SUBCUTANEOUS at 05:25

## 2020-02-23 RX ADMIN — LIDOCAINE 1 PATCH: 4 CREAM TOPICAL at 03:29

## 2020-02-23 RX ADMIN — LIDOCAINE 1 PATCH: 4 CREAM TOPICAL at 21:54

## 2020-02-23 RX ADMIN — CHLORHEXIDINE GLUCONATE 1 APPLICATION(S): 213 SOLUTION TOPICAL at 11:59

## 2020-02-23 RX ADMIN — HEPARIN SODIUM 5000 UNIT(S): 5000 INJECTION INTRAVENOUS; SUBCUTANEOUS at 17:46

## 2020-02-23 NOTE — PROGRESS NOTE ADULT - SUBJECTIVE AND OBJECTIVE BOX
Patient is a 89y old  Female who presents with a chief complaint of fall, R humeral fracture (22 Feb 2020 11:44)      SUBJECTIVE / OVERNIGHT EVENTS: pain is better controlled today     MEDICATIONS  (STANDING):  chlorhexidine 2% Cloths 1 Application(s) Topical daily  heparin  Injectable 5000 Unit(s) SubCutaneous every 12 hours  lidocaine   Patch 1 Patch Transdermal daily  lidocaine   Patch 1 Patch Transdermal every 24 hours  metoprolol tartrate 50 milliGRAM(s) Oral two times a day    MEDICATIONS  (PRN):  acetaminophen   Tablet .. 650 milliGRAM(s) Oral every 8 hours PRN Temp greater or equal to 38C (100.4F), Mild Pain (1 - 3), Moderate Pain (4 - 6)      Vital Signs Last 24 Hrs  T(C): 36.9 (23 Feb 2020 07:43), Max: 37.4 (23 Feb 2020 00:36)  T(F): 98.4 (23 Feb 2020 07:43), Max: 99.3 (23 Feb 2020 00:36)  HR: 83 (23 Feb 2020 07:43) (83 - 93)  BP: 125/77 (23 Feb 2020 07:43) (108/64 - 131/76)  BP(mean): --  RR: 18 (23 Feb 2020 07:43) (18 - 18)  SpO2: 98% (23 Feb 2020 07:43) (96% - 98%)  CAPILLARY BLOOD GLUCOSE        I&O's Summary    22 Feb 2020 07:01  -  23 Feb 2020 07:00  --------------------------------------------------------  IN: 1090 mL / OUT: 400 mL / NET: 690 mL        PHYSICAL EXAM:  GENERAL: NAD  EYES:  conjunctiva and sclera clear  NECK: Supple, No JVD  CHEST/LUNG: Clear to auscultation bilaterally; No wheeze  HEART: +S1/S2, reg   ABDOMEN: Soft, Nontender, Nondistended  EXTREMITIES: RUE with sling in place   PSYCH: AAOx3      LABS:    02-22    137  |  101  |  28<H>  ----------------------------<  111<H>  3.6   |  26  |  0.86    Ca    9.0      22 Feb 2020 07:09    TPro  6.0  /  Alb  3.7  /  TBili  0.5  /  DBili  x   /  AST  35  /  ALT  55<H>  /  AlkPhos  57  02-22

## 2020-02-23 NOTE — PROGRESS NOTE ADULT - PROBLEM SELECTOR PLAN 1
-MRI pelvis showing nondisplaced fractures of the right superior pubic acetabular junction, the right inferior pubic ramus fracture, the right sacral alar area, and the right posterior right iliac bone  -CT pelvis done, results noted  -WBAT in LE b/l, NWB in RUE   -c/w pain management  -pending KODY

## 2020-02-24 ENCOUNTER — TRANSCRIPTION ENCOUNTER (OUTPATIENT)
Age: 85
End: 2020-02-24

## 2020-02-24 LAB
ALBUMIN SERPL ELPH-MCNC: 3.5 G/DL — SIGNIFICANT CHANGE UP (ref 3.3–5)
ALP SERPL-CCNC: 55 U/L — SIGNIFICANT CHANGE UP (ref 40–120)
ALT FLD-CCNC: 44 U/L — SIGNIFICANT CHANGE UP (ref 10–45)
ANION GAP SERPL CALC-SCNC: 11 MMOL/L — SIGNIFICANT CHANGE UP (ref 5–17)
AST SERPL-CCNC: 33 U/L — SIGNIFICANT CHANGE UP (ref 10–40)
BILIRUB DIRECT SERPL-MCNC: 0.1 MG/DL — SIGNIFICANT CHANGE UP (ref 0–0.2)
BILIRUB INDIRECT FLD-MCNC: 0.4 MG/DL — SIGNIFICANT CHANGE UP (ref 0.2–1)
BILIRUB SERPL-MCNC: 0.5 MG/DL — SIGNIFICANT CHANGE UP (ref 0.2–1.2)
BUN SERPL-MCNC: 23 MG/DL — SIGNIFICANT CHANGE UP (ref 7–23)
CALCIUM SERPL-MCNC: 9.1 MG/DL — SIGNIFICANT CHANGE UP (ref 8.4–10.5)
CHLORIDE SERPL-SCNC: 104 MMOL/L — SIGNIFICANT CHANGE UP (ref 96–108)
CO2 SERPL-SCNC: 25 MMOL/L — SIGNIFICANT CHANGE UP (ref 22–31)
CREAT SERPL-MCNC: 0.85 MG/DL — SIGNIFICANT CHANGE UP (ref 0.5–1.3)
GLUCOSE SERPL-MCNC: 107 MG/DL — HIGH (ref 70–99)
HCT VFR BLD CALC: 32.9 % — LOW (ref 34.5–45)
HGB BLD-MCNC: 10.8 G/DL — LOW (ref 11.5–15.5)
MCHC RBC-ENTMCNC: 30.8 PG — SIGNIFICANT CHANGE UP (ref 27–34)
MCHC RBC-ENTMCNC: 32.8 GM/DL — SIGNIFICANT CHANGE UP (ref 32–36)
MCV RBC AUTO: 93.7 FL — SIGNIFICANT CHANGE UP (ref 80–100)
NRBC # BLD: 0 /100 WBCS — SIGNIFICANT CHANGE UP (ref 0–0)
PLATELET # BLD AUTO: 208 K/UL — SIGNIFICANT CHANGE UP (ref 150–400)
POTASSIUM SERPL-MCNC: 3.9 MMOL/L — SIGNIFICANT CHANGE UP (ref 3.5–5.3)
POTASSIUM SERPL-SCNC: 3.9 MMOL/L — SIGNIFICANT CHANGE UP (ref 3.5–5.3)
PROT SERPL-MCNC: 6.3 G/DL — SIGNIFICANT CHANGE UP (ref 6–8.3)
RBC # BLD: 3.51 M/UL — LOW (ref 3.8–5.2)
RBC # FLD: 13.3 % — SIGNIFICANT CHANGE UP (ref 10.3–14.5)
SODIUM SERPL-SCNC: 140 MMOL/L — SIGNIFICANT CHANGE UP (ref 135–145)
WBC # BLD: 8.14 K/UL — SIGNIFICANT CHANGE UP (ref 3.8–10.5)
WBC # FLD AUTO: 8.14 K/UL — SIGNIFICANT CHANGE UP (ref 3.8–10.5)

## 2020-02-24 PROCEDURE — 99232 SBSQ HOSP IP/OBS MODERATE 35: CPT

## 2020-02-24 RX ORDER — TRIAMTERENE 100 MG/1
0 CAPSULE ORAL
Qty: 0 | Refills: 0 | DISCHARGE

## 2020-02-24 RX ORDER — METOPROLOL TARTRATE 50 MG
1 TABLET ORAL
Qty: 0 | Refills: 0 | DISCHARGE
Start: 2020-02-24

## 2020-02-24 RX ORDER — LIDOCAINE 4 G/100G
1 CREAM TOPICAL
Qty: 0 | Refills: 0 | DISCHARGE
Start: 2020-02-24

## 2020-02-24 RX ORDER — METOPROLOL TARTRATE 50 MG
1 TABLET ORAL
Qty: 0 | Refills: 0 | DISCHARGE

## 2020-02-24 RX ORDER — HEPARIN SODIUM 5000 [USP'U]/ML
5000 INJECTION INTRAVENOUS; SUBCUTANEOUS
Qty: 0 | Refills: 0 | DISCHARGE
Start: 2020-02-24

## 2020-02-24 RX ADMIN — LIDOCAINE 1 PATCH: 4 CREAM TOPICAL at 11:54

## 2020-02-24 RX ADMIN — LIDOCAINE 1 PATCH: 4 CREAM TOPICAL at 19:00

## 2020-02-24 RX ADMIN — LIDOCAINE 1 PATCH: 4 CREAM TOPICAL at 19:05

## 2020-02-24 RX ADMIN — CHLORHEXIDINE GLUCONATE 1 APPLICATION(S): 213 SOLUTION TOPICAL at 11:47

## 2020-02-24 RX ADMIN — HEPARIN SODIUM 5000 UNIT(S): 5000 INJECTION INTRAVENOUS; SUBCUTANEOUS at 18:10

## 2020-02-24 RX ADMIN — HEPARIN SODIUM 5000 UNIT(S): 5000 INJECTION INTRAVENOUS; SUBCUTANEOUS at 05:57

## 2020-02-24 RX ADMIN — LIDOCAINE 1 PATCH: 4 CREAM TOPICAL at 03:00

## 2020-02-24 RX ADMIN — Medication 50 MILLIGRAM(S): at 05:59

## 2020-02-24 RX ADMIN — Medication 50 MILLIGRAM(S): at 18:10

## 2020-02-24 NOTE — PROGRESS NOTE ADULT - SUBJECTIVE AND OBJECTIVE BOX
Patient is a 89y old  Female who presents with a chief complaint of fall, R humeral fracture (23 Feb 2020 14:06)    SUBJECTIVE / OVERNIGHT EVENTS: no acute events overnight,     MEDICATIONS  (STANDING):  chlorhexidine 2% Cloths 1 Application(s) Topical daily  heparin  Injectable 5000 Unit(s) SubCutaneous every 12 hours  lidocaine   Patch 1 Patch Transdermal daily  lidocaine   Patch 1 Patch Transdermal every 24 hours  metoprolol tartrate 50 milliGRAM(s) Oral two times a day    MEDICATIONS  (PRN):  acetaminophen   Tablet .. 650 milliGRAM(s) Oral every 8 hours PRN Temp greater or equal to 38C (100.4F), Mild Pain (1 - 3), Moderate Pain (4 - 6)      Vital Signs Last 24 Hrs  T(C): 37.5 (24 Feb 2020 00:39), Max: 37.5 (24 Feb 2020 00:39)  T(F): 99.5 (24 Feb 2020 00:39), Max: 99.5 (24 Feb 2020 00:39)  HR: 108 (24 Feb 2020 06:00) (93 - 108)  BP: 130/72 (24 Feb 2020 06:00) (111/69 - 130/72)  BP(mean): --  RR: 18 (24 Feb 2020 00:39) (18 - 18)  SpO2: 97% (24 Feb 2020 00:39) (95% - 97%)  CAPILLARY BLOOD GLUCOSE        I&O's Summary    23 Feb 2020 07:01  -  24 Feb 2020 07:00  --------------------------------------------------------  IN: 620 mL / OUT: 850 mL / NET: -230 mL        PHYSICAL EXAM:  GENERAL: NAD  EYES:  conjunctiva and sclera clear  CHEST/LUNG: Clear to auscultation bilaterally; No wheeze  HEART: +S1/S2, reg   ABDOMEN: Soft, Nontender, Nondistended  EXTREMITIES: no LE edema, RUE in sling   PSYCH: AAOx3      LABS:                        10.8   8.14  )-----------( 208      ( 24 Feb 2020 09:16 )             32.9     02-24    140  |  104  |  23  ----------------------------<  107<H>  3.9   |  25  |  0.85    Ca    9.1      24 Feb 2020 09:16    TPro  6.3  /  Alb  3.5  /  TBili  0.5  /  DBili  0.1  /  AST  33  /  ALT  44  /  AlkPhos  55  02-24

## 2020-02-24 NOTE — DISCHARGE NOTE PROVIDER - NSDCCPCAREPLAN_GEN_ALL_CORE_FT
PRINCIPAL DISCHARGE DIAGNOSIS  Diagnosis: Closed fracture of proximal end of right humerus, unspecified fracture morphology, initial encounter  Assessment and Plan of Treatment: non weight bearing to right upper shoulder and pain manangment . PT/OT lida. -Follow up outpatient with Dr. Potter upon 5-7 days of discharge.      SECONDARY DISCHARGE DIAGNOSES  Diagnosis: HTN (hypertension)  Assessment and Plan of Treatment: Follow up with your medical doctor to establish long term blood pressure treatment goals.      Diagnosis: Pelvic fracture  Assessment and Plan of Treatment: weight bearing as tolerated , PT and pain management    Diagnosis: Fall  Assessment and Plan of Treatment: fall precautions

## 2020-02-24 NOTE — DISCHARGE NOTE PROVIDER - HOSPITAL COURSE
89F PMH breast ca s/p b/l mastectomy and (prophylactic) hysterectomy, HTN here today after fall at her work office. Patient reports she slipped on the floor after leaving bathroom and fell onto her right side with subsequent difficulty getting up at her work office. She was found by a coworker. She Normally ambulates unassisted and lives alone, completes all iADLs independently. She denies head trauma or LOC, not on a/c. C/o pain to right shoulder/arm and right side of pelvis. She reports she has had progressive imbalance over the past 6 months. 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 She reports muscle aches at night. She has chronic increased urinary frequency with nocturia. She reports recent cold symptoms one week prior (rhinorrhea, no fever, no chills, no N/V, some muscle aches). Reports coworkers were sick and she likely caught it from them; symptoms have been improving. She reports no other recent falls. No F/N/V/chills,chest pain, SOB, cough, sore throat, abdominal pain, diarrhea, constipation.     Pt had an xray which show < from: Xray Humerus, Right (02.20.20 @ 21:31) >    INTERPRETATION: Impacted, comminuted fracture of the proximal humerus.     from: Xray Hip w/ Pelvis 2 or 3 Views, Right (02.20.20 @ 21:22) >	No acute fracture or dislocation. Degenerative changes of the bilateral hips, left greater than right, and the visualized lobar spine. Tricompartmental osteoarthritis of the right knee with severe medial tibiofemoral joint space narrowing and xray of the pelvis show     Right superior inferior pubic rami fractures. 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    Patient was seen by orthopedics and recommended non weight bearing for right humerus fracture and WBAT for lower extremities. No acute surgical intervention. Patient is medically cleared for discharge to rehab.

## 2020-02-24 NOTE — DISCHARGE NOTE PROVIDER - NSDCMRMEDTOKEN_GEN_ALL_CORE_FT
chondroitin 400 mg oral capsule: 2 cap(s) orally once a day  heparin: 5000 unit(s) subcutaneous every 12 hours  lidocaine 5% topical film: Apply topically to affected area once a day to right shoulder  lidocaine 5% topical film: Apply topically to affected area once a day to right hip  metoprolol tartrate 50 mg oral tablet: 1 tab(s) orally 2 times a day

## 2020-02-24 NOTE — PROGRESS NOTE ADULT - PROBLEM SELECTOR PLAN 1
-MRI pelvis showing nondisplaced fractures of the right superior pubic acetabular junction, the right inferior pubic ramus fracture, the right sacral alar area, and the right posterior right iliac bone  -CT pelvis done, results noted  -WBAT in LE b/l, NWB in RUE   -c/w pain management  -pending KODY, discussed with CM - to reach out to family today, they have submitted choices

## 2020-02-25 PROCEDURE — 99232 SBSQ HOSP IP/OBS MODERATE 35: CPT

## 2020-02-25 RX ADMIN — LIDOCAINE 1 PATCH: 4 CREAM TOPICAL at 23:18

## 2020-02-25 RX ADMIN — CHLORHEXIDINE GLUCONATE 1 APPLICATION(S): 213 SOLUTION TOPICAL at 11:24

## 2020-02-25 RX ADMIN — LIDOCAINE 1 PATCH: 4 CREAM TOPICAL at 00:00

## 2020-02-25 RX ADMIN — HEPARIN SODIUM 5000 UNIT(S): 5000 INJECTION INTRAVENOUS; SUBCUTANEOUS at 06:32

## 2020-02-25 RX ADMIN — HEPARIN SODIUM 5000 UNIT(S): 5000 INJECTION INTRAVENOUS; SUBCUTANEOUS at 17:04

## 2020-02-25 RX ADMIN — LIDOCAINE 1 PATCH: 4 CREAM TOPICAL at 11:23

## 2020-02-25 RX ADMIN — LIDOCAINE 1 PATCH: 4 CREAM TOPICAL at 19:20

## 2020-02-25 RX ADMIN — LIDOCAINE 1 PATCH: 4 CREAM TOPICAL at 11:24

## 2020-02-25 RX ADMIN — Medication 50 MILLIGRAM(S): at 06:32

## 2020-02-25 RX ADMIN — Medication 50 MILLIGRAM(S): at 17:04

## 2020-02-25 NOTE — PROGRESS NOTE ADULT - SUBJECTIVE AND OBJECTIVE BOX
Patient is a 89y old  Female who presents with a chief complaint of fall, R humeral fracture (24 Feb 2020 17:33)    SUBJECTIVE / OVERNIGHT EVENTS: no acute events overnight. PT was able to get out of bed and into chair with assistance yesterday.     MEDICATIONS  (STANDING):  chlorhexidine 2% Cloths 1 Application(s) Topical daily  heparin  Injectable 5000 Unit(s) SubCutaneous every 12 hours  lidocaine   Patch 1 Patch Transdermal daily  lidocaine   Patch 1 Patch Transdermal every 24 hours  metoprolol tartrate 50 milliGRAM(s) Oral two times a day    MEDICATIONS  (PRN):  acetaminophen   Tablet .. 650 milliGRAM(s) Oral every 8 hours PRN Temp greater or equal to 38C (100.4F), Mild Pain (1 - 3), Moderate Pain (4 - 6)      Vital Signs Last 24 Hrs  T(C): 36.6 (25 Feb 2020 11:05), Max: 36.8 (24 Feb 2020 23:17)  T(F): 97.9 (25 Feb 2020 11:05), Max: 98.3 (24 Feb 2020 23:17)  HR: 90 (25 Feb 2020 11:05) (84 - 94)  BP: 110/68 (25 Feb 2020 11:05) (110/68 - 130/83)  BP(mean): --  RR: 18 (25 Feb 2020 11:05) (18 - 18)  SpO2: 96% (25 Feb 2020 11:05) (94% - 99%)  CAPILLARY BLOOD GLUCOSE        I&O's Summary    25 Feb 2020 07:01  -  25 Feb 2020 12:29  --------------------------------------------------------  IN: 220 mL / OUT: 0 mL / NET: 220 mL    PHYSICAL EXAM:  GENERAL: NAD  EYES:  conjunctiva and sclera clear  CHEST/LUNG: Clear to auscultation bilaterally; No wheeze  HEART: +S1/S2, reg   ABDOMEN: Soft, Nontender, Nondistended; Bowel sounds present  EXTREMITIES: RUE in sling, no LE edema   PSYCH: AAOx3      LABS:                        10.8   8.14  )-----------( 208      ( 24 Feb 2020 09:16 )             32.9     02-24    140  |  104  |  23  ----------------------------<  107<H>  3.9   |  25  |  0.85    Ca    9.1      24 Feb 2020 09:16    TPro  6.3  /  Alb  3.5  /  TBili  0.5  /  DBili  0.1  /  AST  33  /  ALT  44  /  AlkPhos  55  02-24

## 2020-02-25 NOTE — PHARMACOTHERAPY INTERVENTION NOTE - COMMENTS
Pharmacy intern discussed discharge medication name, dose, frequency, and side effects. Medication information handout provided from Med Essential Fact Sheet ([employer]).

## 2020-02-25 NOTE — PROGRESS NOTE ADULT - PROBLEM SELECTOR PLAN 1
-MRI pelvis showing nondisplaced fractures of the right superior pubic acetabular junction, the right inferior pubic ramus fracture, the right sacral alar area, and the right posterior right iliac bone  -CT pelvis done, results noted  -WBAT in LE b/l, NWB in RUE   -c/w pain management  -pending KODY - insurance authorization, discussed on IDRs today

## 2020-02-25 NOTE — PROGRESS NOTE ADULT - PROBLEM SELECTOR PLAN 3
- s/p mechanical fall likely 2/2 age, arthritis, ?recent URI?  - orthostatics neg   - no head injury or LOC  - fall precautions

## 2020-02-26 ENCOUNTER — TRANSCRIPTION ENCOUNTER (OUTPATIENT)
Age: 85
End: 2020-02-26

## 2020-02-26 VITALS
HEART RATE: 98 BPM | SYSTOLIC BLOOD PRESSURE: 106 MMHG | TEMPERATURE: 99 F | DIASTOLIC BLOOD PRESSURE: 65 MMHG | OXYGEN SATURATION: 95 % | RESPIRATION RATE: 18 BRPM

## 2020-02-26 PROCEDURE — 86901 BLOOD TYPING SEROLOGIC RH(D): CPT

## 2020-02-26 PROCEDURE — 97161 PT EVAL LOW COMPLEX 20 MIN: CPT

## 2020-02-26 PROCEDURE — 72195 MRI PELVIS W/O DYE: CPT

## 2020-02-26 PROCEDURE — 97166 OT EVAL MOD COMPLEX 45 MIN: CPT

## 2020-02-26 PROCEDURE — 85610 PROTHROMBIN TIME: CPT

## 2020-02-26 PROCEDURE — 80048 BASIC METABOLIC PNL TOTAL CA: CPT

## 2020-02-26 PROCEDURE — 80053 COMPREHEN METABOLIC PANEL: CPT

## 2020-02-26 PROCEDURE — 83874 ASSAY OF MYOGLOBIN: CPT

## 2020-02-26 PROCEDURE — 97535 SELF CARE MNGMENT TRAINING: CPT

## 2020-02-26 PROCEDURE — 86850 RBC ANTIBODY SCREEN: CPT

## 2020-02-26 PROCEDURE — 71045 X-RAY EXAM CHEST 1 VIEW: CPT

## 2020-02-26 PROCEDURE — 73060 X-RAY EXAM OF HUMERUS: CPT

## 2020-02-26 PROCEDURE — 87581 M.PNEUMON DNA AMP PROBE: CPT

## 2020-02-26 PROCEDURE — 80074 ACUTE HEPATITIS PANEL: CPT

## 2020-02-26 PROCEDURE — 86900 BLOOD TYPING SEROLOGIC ABO: CPT

## 2020-02-26 PROCEDURE — 85027 COMPLETE CBC AUTOMATED: CPT

## 2020-02-26 PROCEDURE — 72192 CT PELVIS W/O DYE: CPT

## 2020-02-26 PROCEDURE — 99285 EMERGENCY DEPT VISIT HI MDM: CPT | Mod: 25

## 2020-02-26 PROCEDURE — 87798 DETECT AGENT NOS DNA AMP: CPT

## 2020-02-26 PROCEDURE — 73020 X-RAY EXAM OF SHOULDER: CPT

## 2020-02-26 PROCEDURE — 73552 X-RAY EXAM OF FEMUR 2/>: CPT

## 2020-02-26 PROCEDURE — 72190 X-RAY EXAM OF PELVIS: CPT

## 2020-02-26 PROCEDURE — 87486 CHLMYD PNEUM DNA AMP PROBE: CPT

## 2020-02-26 PROCEDURE — 97530 THERAPEUTIC ACTIVITIES: CPT

## 2020-02-26 PROCEDURE — 86038 ANTINUCLEAR ANTIBODIES: CPT

## 2020-02-26 PROCEDURE — 73502 X-RAY EXAM HIP UNI 2-3 VIEWS: CPT

## 2020-02-26 PROCEDURE — 97110 THERAPEUTIC EXERCISES: CPT

## 2020-02-26 PROCEDURE — 97116 GAIT TRAINING THERAPY: CPT

## 2020-02-26 PROCEDURE — 84100 ASSAY OF PHOSPHORUS: CPT

## 2020-02-26 PROCEDURE — 76705 ECHO EXAM OF ABDOMEN: CPT

## 2020-02-26 PROCEDURE — 99239 HOSP IP/OBS DSCHRG MGMT >30: CPT

## 2020-02-26 PROCEDURE — 76377 3D RENDER W/INTRP POSTPROCES: CPT

## 2020-02-26 PROCEDURE — 85730 THROMBOPLASTIN TIME PARTIAL: CPT

## 2020-02-26 PROCEDURE — 93005 ELECTROCARDIOGRAM TRACING: CPT

## 2020-02-26 PROCEDURE — 87633 RESP VIRUS 12-25 TARGETS: CPT

## 2020-02-26 PROCEDURE — 80076 HEPATIC FUNCTION PANEL: CPT

## 2020-02-26 PROCEDURE — 82550 ASSAY OF CK (CPK): CPT

## 2020-02-26 PROCEDURE — 73030 X-RAY EXAM OF SHOULDER: CPT

## 2020-02-26 PROCEDURE — 83735 ASSAY OF MAGNESIUM: CPT

## 2020-02-26 RX ADMIN — Medication 650 MILLIGRAM(S): at 03:00

## 2020-02-26 RX ADMIN — Medication 50 MILLIGRAM(S): at 06:14

## 2020-02-26 RX ADMIN — Medication 650 MILLIGRAM(S): at 04:00

## 2020-02-26 RX ADMIN — LIDOCAINE 1 PATCH: 4 CREAM TOPICAL at 11:46

## 2020-02-26 RX ADMIN — HEPARIN SODIUM 5000 UNIT(S): 5000 INJECTION INTRAVENOUS; SUBCUTANEOUS at 06:14

## 2020-02-26 RX ADMIN — CHLORHEXIDINE GLUCONATE 1 APPLICATION(S): 213 SOLUTION TOPICAL at 11:47

## 2020-02-26 RX ADMIN — LIDOCAINE 1 PATCH: 4 CREAM TOPICAL at 11:47

## 2020-02-26 NOTE — DISCHARGE NOTE NURSING/CASE MANAGEMENT/SOCIAL WORK - NSDCPEEMAIL_GEN_ALL_CORE
[facility] for Tobacco Control email [email or web address]

## 2020-02-26 NOTE — PROGRESS NOTE ADULT - REASON FOR ADMISSION
fall, R humeral fracture

## 2020-02-26 NOTE — PROGRESS NOTE ADULT - PROBLEM SELECTOR PLAN 1
-MRI pelvis showing nondisplaced fractures of the right superior pubic acetabular junction, the right inferior pubic ramus fracture, the right sacral alar area, and the right posterior right iliac bone  -CT pelvis done, results noted  -WBAT in LE b/l, NWB in RUE   -c/w pain management  -pending KODY, auth obtained, pt to go today

## 2020-02-26 NOTE — DISCHARGE NOTE NURSING/CASE MANAGEMENT/SOCIAL WORK - PATIENT PORTAL LINK FT
You can access the FollowMyHealth Patient Portal offered by [facility] by registering at the following website: [email or web address]. By joining [facility]’s FollowMyHealth portal, you will also be able to view your health information using other applications (apps) compatible with our system.

## 2020-02-26 NOTE — DISCHARGE NOTE NURSING/CASE MANAGEMENT/SOCIAL WORK - NSDCPEWEB_GEN_ALL_CORE
[facility] for Tobacco Control website --- [email or web address] --- [email or web address]

## 2020-02-26 NOTE — PROGRESS NOTE ADULT - SUBJECTIVE AND OBJECTIVE BOX
Patient is a 89y old  Female who presents with a chief complaint of fall, R humeral fracture (25 Feb 2020 12:29)      SUBJECTIVE / OVERNIGHT EVENTS: no acute events overnight     MEDICATIONS  (STANDING):  chlorhexidine 2% Cloths 1 Application(s) Topical daily  heparin  Injectable 5000 Unit(s) SubCutaneous every 12 hours  lidocaine   Patch 1 Patch Transdermal daily  lidocaine   Patch 1 Patch Transdermal every 24 hours  metoprolol tartrate 50 milliGRAM(s) Oral two times a day    MEDICATIONS  (PRN):  acetaminophen   Tablet .. 650 milliGRAM(s) Oral every 8 hours PRN Temp greater or equal to 38C (100.4F), Mild Pain (1 - 3), Moderate Pain (4 - 6)      Vital Signs Last 24 Hrs  T(C): 36.6 (26 Feb 2020 07:36), Max: 36.7 (25 Feb 2020 16:33)  T(F): 97.9 (26 Feb 2020 07:36), Max: 98.1 (25 Feb 2020 16:33)  HR: 90 (26 Feb 2020 07:36) (90 - 100)  BP: 147/85 (26 Feb 2020 07:36) (110/68 - 147/85)  BP(mean): --  RR: 18 (26 Feb 2020 07:36) (16 - 18)  SpO2: 96% (26 Feb 2020 07:36) (96% - 98%)  CAPILLARY BLOOD GLUCOSE        I&O's Summary    25 Feb 2020 07:01  -  26 Feb 2020 07:00  --------------------------------------------------------  IN: 460 mL / OUT: 300 mL / NET: 160 mL        PHYSICAL EXAM:  GENERAL: NAD  EYES: conjunctiva and sclera clear  CHEST/LUNG: Clear to auscultation bilaterally; No wheeze  HEART: +S1/S2, reg  ABDOMEN: Soft, Nontender, Nondistended; Bowel sounds present  EXTREMITIES: RUE in sling, no LE edema   PSYCH: AAOx3

## 2020-03-13 PROBLEM — C50.919 MALIGNANT NEOPLASM OF UNSPECIFIED SITE OF UNSPECIFIED FEMALE BREAST: Chronic | Status: ACTIVE | Noted: 2020-02-20

## 2020-03-16 ENCOUNTER — APPOINTMENT (OUTPATIENT)
Dept: ORTHOPEDIC SURGERY | Facility: CLINIC | Age: 85
End: 2020-03-16
Payer: MEDICARE

## 2020-03-16 VITALS — BODY MASS INDEX: 22.58 KG/M2 | WEIGHT: 115 LBS | HEIGHT: 60 IN

## 2020-03-16 PROCEDURE — 99203 OFFICE O/P NEW LOW 30 MIN: CPT

## 2020-03-16 NOTE — PHYSICAL EXAM
[de-identified] : Patient is WDWN, alert, and in no acute distress. Breathing is unlabored. She is grossly oriented to person, place, and time. \par \par Right Shoulder: \par Inspection/ Palpation: Arm is immobilized in a humeral brace. No pain at the fracture site. \par Range of Motion: Patient is able to tolerate passive flexion of the shoulder. \par Strength: forward elevation, internal rotation, external rotation, adduction, and abduction are 5/5. \par Stability: no joint instability on provocative testing.  [de-identified] : X-rays of the right humerus taken on 03/12/2020 via STAT Portable X-ray revealed a impacted, comminuted fracture of the proximal humerus at the level of the surgical neck. There is mild glenohumeral arthritis. Mild acromioclavicular joint arthrosis. Surgical clips overlie the right axilla.\par \par X-rays of the pelvis taken on 03/12/2020 via STAT Portable X-ray revealed a nondisplaced right inferior pubic ramus fracture and nondisplaced right superior pubic acetabular junction fracture.

## 2020-03-16 NOTE — DISCUSSION/SUMMARY
[de-identified] : The underlying pathophysiology was reviewed with the patient. Treatment options were discussed including; observation, NSAIDS, analgesics, bracing, physical therapy. \par \par Patient's weight bearing status should remain NWB for both the right arm and the lower extremity for the next 4 weeks. \par Paperwork for rehab was completed by me. \par Follow up 6 weeks for repeat x-rays.

## 2020-03-16 NOTE — HISTORY OF PRESENT ILLNESS
[de-identified] : Patient is a 89 y/o female who presents with pubic rami fractures and a right proximal humerus fracture.  She fell on 2/20/20 and landed on her right side. She was taken to [facility] where x-rays of the right shoulder and pelvis were taken. X-rays of the pelvis revealed a nondisplaced inferior pubic ramus fracture and a nondisplaced superior pubic acetabular junction fracture. X-rays of the humerus revealed a comminuted fracture of the proximal humerus at the level of the surgical neck. She is currently recovering at [facility].  New xrays were taken at rehab on 3/12/20. She does not have pain. She presents today with her son for clarification of weight bearing status for the rehab facility.

## 2020-03-16 NOTE — ADDENDUM
[FreeTextEntry1] : I, Juli Dawn wrote this note acting as a scribe for Dr. Griffin Potter on Mar 16, 2020.

## 2020-03-16 NOTE — END OF VISIT
[FreeTextEntry3] : I, Griffin Potter MD, ordering physician, have read and attest that all the information, medical decision making and discharge instructions within are true and accurate.

## 2020-09-08 DIAGNOSIS — S32.599A OTHER SPECIFIED FRACTURE OF UNSPECIFIED PUBIS, INITIAL ENCOUNTER FOR CLOSED FRACTURE: ICD-10-CM

## 2020-09-09 ENCOUNTER — NON-APPOINTMENT (OUTPATIENT)
Age: 85
End: 2020-09-09

## 2020-09-09 ENCOUNTER — LABORATORY RESULT (OUTPATIENT)
Age: 85
End: 2020-09-09

## 2020-09-09 ENCOUNTER — APPOINTMENT (OUTPATIENT)
Dept: INTERNAL MEDICINE | Facility: CLINIC | Age: 85
End: 2020-09-09
Payer: MEDICARE

## 2020-09-09 VITALS
SYSTOLIC BLOOD PRESSURE: 111 MMHG | TEMPERATURE: 95.3 F | HEIGHT: 57.48 IN | OXYGEN SATURATION: 97 % | WEIGHT: 114.64 LBS | DIASTOLIC BLOOD PRESSURE: 70 MMHG | HEART RATE: 71 BPM | BODY MASS INDEX: 24.39 KG/M2

## 2020-09-09 DIAGNOSIS — S42.291A OTHER DISPLACED FRACTURE OF UPPER END OF RIGHT HUMERUS, INITIAL ENCOUNTER FOR CLOSED FRACTURE: ICD-10-CM

## 2020-09-09 PROCEDURE — 99213 OFFICE O/P EST LOW 20 MIN: CPT | Mod: 25

## 2020-09-09 PROCEDURE — G0439: CPT

## 2020-09-09 PROCEDURE — 90662 IIV NO PRSV INCREASED AG IM: CPT

## 2020-09-09 PROCEDURE — G0008: CPT

## 2020-09-12 NOTE — HISTORY OF PRESENT ILLNESS
[de-identified] : Feels well after sustaining fracture, has difficulty raising both arms. Relies on help from family friends for transportation. Feels well, denies any chest pain, palpitations, shortness of breath or change in urination or bowel habits.  [FreeTextEntry1] : Presents for subsequent medicare wellness visit and chronic disease management

## 2020-09-12 NOTE — ASSESSMENT
[FreeTextEntry1] : Blood work done today, repeat LFT's, BP stable, check lipid panel. Influenza administered.

## 2020-09-12 NOTE — HEALTH RISK ASSESSMENT
[Good] : ~his/her~  mood as  good [FreeTextEntry1] : Health maintenance [No] : No [] : No [Any fall with injury in past year] : Patient reported fall with injury in the past year [Intercurrent hospitalizations] : was admitted to the hospital  [0] : 2) Feeling down, depressed, or hopeless: Not at all (0) [de-identified] : fracture [de-identified] : Dr. Potter

## 2020-09-12 NOTE — PHYSICAL EXAM
[Normal Appearance] : normal in appearance [No Masses] : no palpable masses [No Axillary Lymphadenopathy] : no axillary lymphadenopathy [No Nipple Discharge] : no nipple discharge [Normal] : affect was normal and insight and judgment were intact

## 2020-09-14 LAB
25(OH)D3 SERPL-MCNC: 46.1 NG/ML
ALBUMIN SERPL ELPH-MCNC: 4.9 G/DL
ALP BLD-CCNC: 79 U/L
ALT SERPL-CCNC: 84 U/L
ANION GAP SERPL CALC-SCNC: 14 MMOL/L
APPEARANCE: ABNORMAL
APPEARANCE: ABNORMAL
AST SERPL-CCNC: 75 U/L
BACTERIA: ABNORMAL
BASOPHILS # BLD AUTO: 0.06 K/UL
BASOPHILS NFR BLD AUTO: 0.7 %
BILIRUB SERPL-MCNC: 0.6 MG/DL
BILIRUBIN URINE: NEGATIVE
BILIRUBIN URINE: NEGATIVE
BLOOD URINE: NEGATIVE
BLOOD URINE: NEGATIVE
BUN SERPL-MCNC: 35 MG/DL
CALCIUM SERPL-MCNC: 10.7 MG/DL
CHLORIDE SERPL-SCNC: 101 MMOL/L
CHOLEST SERPL-MCNC: 198 MG/DL
CHOLEST/HDLC SERPL: 2.8 RATIO
CO2 SERPL-SCNC: 27 MMOL/L
COLOR: YELLOW
COLOR: YELLOW
CREAT SERPL-MCNC: 1.24 MG/DL
EOSINOPHIL # BLD AUTO: 0.1 K/UL
EOSINOPHIL NFR BLD AUTO: 1.1 %
ESTIMATED AVERAGE GLUCOSE: 103 MG/DL
GLUCOSE QUALITATIVE U: NEGATIVE
GLUCOSE QUALITATIVE U: NEGATIVE
GLUCOSE SERPL-MCNC: 114 MG/DL
HBA1C MFR BLD HPLC: 5.2 %
HCT VFR BLD CALC: 43.8 %
HDLC SERPL-MCNC: 70 MG/DL
HGB BLD-MCNC: 14 G/DL
HYALINE CASTS: 4 /LPF
IMM GRANULOCYTES NFR BLD AUTO: 0.3 %
KETONES URINE: NEGATIVE
KETONES URINE: NEGATIVE
LDLC SERPL CALC-MCNC: 99 MG/DL
LEUKOCYTE ESTERASE URINE: ABNORMAL
LEUKOCYTE ESTERASE URINE: ABNORMAL
LYMPHOCYTES # BLD AUTO: 1.76 K/UL
LYMPHOCYTES NFR BLD AUTO: 20 %
MAN DIFF?: NORMAL
MCHC RBC-ENTMCNC: 30.4 PG
MCHC RBC-ENTMCNC: 32 GM/DL
MCV RBC AUTO: 95.2 FL
MICROSCOPIC-UA: NORMAL
MONOCYTES # BLD AUTO: 0.59 K/UL
MONOCYTES NFR BLD AUTO: 6.7 %
NEUTROPHILS # BLD AUTO: 6.27 K/UL
NEUTROPHILS NFR BLD AUTO: 71.2 %
NITRITE URINE: POSITIVE
NITRITE URINE: POSITIVE
PH URINE: 6.5
PH URINE: 6.5
PLATELET # BLD AUTO: 331 K/UL
POTASSIUM SERPL-SCNC: 5.2 MMOL/L
PROT SERPL-MCNC: 7.2 G/DL
PROTEIN URINE: NORMAL
PROTEIN URINE: NORMAL
RBC # BLD: 4.6 M/UL
RBC # FLD: 13.5 %
RED BLOOD CELLS URINE: 2 /HPF
SODIUM SERPL-SCNC: 142 MMOL/L
SPECIFIC GRAVITY URINE: 1.02
SPECIFIC GRAVITY URINE: 1.02
SQUAMOUS EPITHELIAL CELLS: 5 /HPF
TRIGL SERPL-MCNC: 145 MG/DL
TSH SERPL-ACNC: 2.37 UIU/ML
UROBILINOGEN URINE: NORMAL
UROBILINOGEN URINE: NORMAL
VIT B12 SERPL-MCNC: 623 PG/ML
WBC # FLD AUTO: 8.81 K/UL
WHITE BLOOD CELLS URINE: 111 /HPF

## 2020-11-23 NOTE — PROGRESS NOTE ADULT - ASSESSMENT
Final Anesthesia Post-op Assessment    Patient: Gabriel Gao  Procedure(s) Performed: COLONOSCOPYEGD  Anesthesia type: MAC    Vitals Value Taken Time   Temp 36 11/23/20 1225   Pulse 78 11/23/20 1225   Resp 21 11/23/20 1225   SpO2 97 11/23/20 1225   /78 11/23/20 1225         Patient Location: PACU Phase 1  Post-op Vital Signs:stable  Level of Consciousness: awake and alert  Respiratory Status: spontaneous ventilation  Cardiovascular stable  Hydration: euvolemic  Pain Management: adequately controlled  Handoff: Handoff to receiving nurse was performed and questions were answered  Vomiting: none   Nausea: None  Airway Patency:patent  Post-op Assessment: no complications and patient tolerated procedure well with no complications      No complications documented.   
89F PMH breast ca s/p b/l mastectomy and (prophylactic) hysterectomy here today after fall at home. Pt slipped on the floor after leaving bathroom and fell onto her right side with subsequent difficulty getting up, found with Impacted, comminuted fracture of the proximal humerus 2/2 fall.

## 2020-12-09 ENCOUNTER — LABORATORY RESULT (OUTPATIENT)
Age: 85
End: 2020-12-09

## 2020-12-09 ENCOUNTER — APPOINTMENT (OUTPATIENT)
Dept: INTERNAL MEDICINE | Facility: CLINIC | Age: 85
End: 2020-12-09
Payer: MEDICARE

## 2020-12-09 VITALS
WEIGHT: 119.15 LBS | HEIGHT: 57.87 IN | DIASTOLIC BLOOD PRESSURE: 72 MMHG | SYSTOLIC BLOOD PRESSURE: 113 MMHG | TEMPERATURE: 98.4 F | OXYGEN SATURATION: 97 % | HEART RATE: 78 BPM | BODY MASS INDEX: 25.01 KG/M2

## 2020-12-09 DIAGNOSIS — E83.52 HYPERCALCEMIA: ICD-10-CM

## 2020-12-09 PROCEDURE — 99214 OFFICE O/P EST MOD 30 MIN: CPT | Mod: 25

## 2020-12-09 PROCEDURE — 36415 COLL VENOUS BLD VENIPUNCTURE: CPT

## 2020-12-10 NOTE — HISTORY OF PRESENT ILLNESS
[FreeTextEntry1] : Patient presents for follow-up of chronic disease management [de-identified] : Patient patient recently had a fall, fracture of the ribs.  X-ray showed no pneumothorax, patient denies any shortness of breath pain is well tolerated we will start physical therapy for left shoulder pain.  Has been not watching diet.  Denies any chest pain chest tightness palpitations abdominal pain.

## 2020-12-10 NOTE — ASSESSMENT
[FreeTextEntry1] : Blood work done at the office today, check lipid panel elevated liver enzymes, watch diet is much as possible.  Discussed fall risk precautions.  Patient denies any syncopal events denies any dizziness palpitations lightheadedness tinnitus headaches.

## 2020-12-15 LAB
25(OH)D3 SERPL-MCNC: 44.8 NG/ML
ALBUMIN SERPL ELPH-MCNC: 4.6 G/DL
ALP BLD-CCNC: 118 U/L
ALT SERPL-CCNC: 80 U/L
ANION GAP SERPL CALC-SCNC: 14 MMOL/L
APPEARANCE: ABNORMAL
APPEARANCE: ABNORMAL
AST SERPL-CCNC: 59 U/L
BACTERIA: ABNORMAL
BASOPHILS # BLD AUTO: 0.05 K/UL
BASOPHILS NFR BLD AUTO: 0.6 %
BILIRUB SERPL-MCNC: 0.4 MG/DL
BILIRUBIN URINE: NEGATIVE
BILIRUBIN URINE: NEGATIVE
BLOOD URINE: NEGATIVE
BLOOD URINE: NEGATIVE
BUN SERPL-MCNC: 30 MG/DL
CA-I SERPL-SCNC: 1.29 MMOL/L
CALCIUM SERPL-MCNC: 9.6 MG/DL
CALCIUM SERPL-MCNC: 9.6 MG/DL
CHLORIDE SERPL-SCNC: 102 MMOL/L
CHOLEST SERPL-MCNC: 203 MG/DL
CO2 SERPL-SCNC: 26 MMOL/L
COLOR: YELLOW
COLOR: YELLOW
CREAT SERPL-MCNC: 1.03 MG/DL
EOSINOPHIL # BLD AUTO: 0.16 K/UL
EOSINOPHIL NFR BLD AUTO: 2 %
ESTIMATED AVERAGE GLUCOSE: 108 MG/DL
GLUCOSE QUALITATIVE U: NEGATIVE
GLUCOSE QUALITATIVE U: NEGATIVE
GLUCOSE SERPL-MCNC: 83 MG/DL
HBA1C MFR BLD HPLC: 5.4 %
HCT VFR BLD CALC: 45.3 %
HDLC SERPL-MCNC: 70 MG/DL
HGB BLD-MCNC: 14.3 G/DL
HYALINE CASTS: 2 /LPF
IMM GRANULOCYTES NFR BLD AUTO: 0.4 %
KETONES URINE: NEGATIVE
KETONES URINE: NEGATIVE
LDLC SERPL CALC-MCNC: 103 MG/DL
LEUKOCYTE ESTERASE URINE: ABNORMAL
LEUKOCYTE ESTERASE URINE: ABNORMAL
LYMPHOCYTES # BLD AUTO: 1.65 K/UL
LYMPHOCYTES NFR BLD AUTO: 20.5 %
MAN DIFF?: NORMAL
MCHC RBC-ENTMCNC: 30.8 PG
MCHC RBC-ENTMCNC: 31.6 GM/DL
MCV RBC AUTO: 97.6 FL
MICROSCOPIC-UA: NORMAL
MONOCYTES # BLD AUTO: 0.64 K/UL
MONOCYTES NFR BLD AUTO: 8 %
NEUTROPHILS # BLD AUTO: 5.52 K/UL
NEUTROPHILS NFR BLD AUTO: 68.5 %
NITRITE URINE: POSITIVE
NITRITE URINE: POSITIVE
NONHDLC SERPL-MCNC: 133 MG/DL
PARATHYROID HORMONE INTACT: 57 PG/ML
PH URINE: 6
PH URINE: 6
PLATELET # BLD AUTO: 304 K/UL
POTASSIUM SERPL-SCNC: 4.5 MMOL/L
PROT SERPL-MCNC: 6.6 G/DL
PROTEIN URINE: NORMAL
PROTEIN URINE: NORMAL
RBC # BLD: 4.64 M/UL
RBC # FLD: 13.5 %
RED BLOOD CELLS URINE: 1 /HPF
SODIUM SERPL-SCNC: 142 MMOL/L
SPECIFIC GRAVITY URINE: 1.02
SPECIFIC GRAVITY URINE: 1.02
SQUAMOUS EPITHELIAL CELLS: 2 /HPF
TRIGL SERPL-MCNC: 150 MG/DL
TSH SERPL-ACNC: 2.85 UIU/ML
UROBILINOGEN URINE: NORMAL
UROBILINOGEN URINE: NORMAL
WBC # FLD AUTO: 8.05 K/UL
WHITE BLOOD CELLS URINE: 162 /HPF

## 2020-12-31 ENCOUNTER — NON-APPOINTMENT (OUTPATIENT)
Age: 85
End: 2020-12-31

## 2021-01-04 RX ORDER — METOPROLOL TARTRATE 50 MG/1
50 TABLET, FILM COATED ORAL
Qty: 180 | Refills: 3 | Status: DISCONTINUED | COMMUNITY
Start: 2020-12-09 | End: 2021-01-04

## 2021-02-26 ENCOUNTER — APPOINTMENT (OUTPATIENT)
Dept: INTERNAL MEDICINE | Facility: CLINIC | Age: 86
End: 2021-02-26

## 2021-03-17 ENCOUNTER — APPOINTMENT (OUTPATIENT)
Dept: INTERNAL MEDICINE | Facility: CLINIC | Age: 86
End: 2021-03-17
Payer: MEDICARE

## 2021-03-17 ENCOUNTER — LABORATORY RESULT (OUTPATIENT)
Age: 86
End: 2021-03-17

## 2021-03-17 VITALS
WEIGHT: 120.46 LBS | HEART RATE: 77 BPM | BODY MASS INDEX: 25.99 KG/M2 | SYSTOLIC BLOOD PRESSURE: 122 MMHG | DIASTOLIC BLOOD PRESSURE: 69 MMHG | TEMPERATURE: 98 F | HEIGHT: 57.09 IN | OXYGEN SATURATION: 97 %

## 2021-03-17 DIAGNOSIS — R79.89 OTHER SPECIFIED ABNORMAL FINDINGS OF BLOOD CHEMISTRY: ICD-10-CM

## 2021-03-17 PROCEDURE — 99214 OFFICE O/P EST MOD 30 MIN: CPT | Mod: 25

## 2021-03-17 PROCEDURE — 36415 COLL VENOUS BLD VENIPUNCTURE: CPT

## 2021-03-17 RX ORDER — CHLORHEXIDINE GLUCONATE 4 %
1000 LIQUID (ML) TOPICAL DAILY
Qty: 90 | Refills: 3 | Status: ACTIVE | COMMUNITY
Start: 2021-03-17

## 2021-03-17 RX ORDER — UBIDECARENONE/VIT E ACET 100MG-5
25 MCG CAPSULE ORAL
Qty: 90 | Refills: 3 | Status: ACTIVE | COMMUNITY
Start: 2021-03-17 | End: 1900-01-01

## 2021-03-24 LAB
25(OH)D3 SERPL-MCNC: 36 NG/ML
ALBUMIN SERPL ELPH-MCNC: 4.6 G/DL
ALP BLD-CCNC: 102 U/L
ALT SERPL-CCNC: 104 U/L
ANION GAP SERPL CALC-SCNC: 13 MMOL/L
APPEARANCE: ABNORMAL
APPEARANCE: ABNORMAL
AST SERPL-CCNC: 85 U/L
BACTERIA: ABNORMAL
BASOPHILS # BLD AUTO: 0.08 K/UL
BASOPHILS NFR BLD AUTO: 0.9 %
BILIRUB SERPL-MCNC: 0.4 MG/DL
BILIRUBIN URINE: NEGATIVE
BILIRUBIN URINE: NEGATIVE
BLOOD URINE: NORMAL
BLOOD URINE: NORMAL
BUN SERPL-MCNC: 25 MG/DL
CALCIUM SERPL-MCNC: 10.6 MG/DL
CHLORIDE SERPL-SCNC: 106 MMOL/L
CHOLEST SERPL-MCNC: 237 MG/DL
CO2 SERPL-SCNC: 26 MMOL/L
COLOR: YELLOW
COLOR: YELLOW
CREAT SERPL-MCNC: 1.06 MG/DL
EOSINOPHIL # BLD AUTO: 0.2 K/UL
EOSINOPHIL NFR BLD AUTO: 2.3 %
ESTIMATED AVERAGE GLUCOSE: 105 MG/DL
GLUCOSE QUALITATIVE U: NEGATIVE
GLUCOSE QUALITATIVE U: NEGATIVE
GLUCOSE SERPL-MCNC: 103 MG/DL
HBA1C MFR BLD HPLC: 5.3 %
HCT VFR BLD CALC: 43 %
HDLC SERPL-MCNC: 70 MG/DL
HGB BLD-MCNC: 13.9 G/DL
HYALINE CASTS: 1 /LPF
IMM GRANULOCYTES NFR BLD AUTO: 0.5 %
KETONES URINE: NEGATIVE
KETONES URINE: NEGATIVE
LDLC SERPL CALC-MCNC: 132 MG/DL
LEUKOCYTE ESTERASE URINE: ABNORMAL
LEUKOCYTE ESTERASE URINE: ABNORMAL
LYMPHOCYTES # BLD AUTO: 1.84 K/UL
LYMPHOCYTES NFR BLD AUTO: 21.4 %
MAN DIFF?: NORMAL
MCHC RBC-ENTMCNC: 32.3 GM/DL
MCHC RBC-ENTMCNC: 32.3 PG
MCV RBC AUTO: 100 FL
MICROSCOPIC-UA: NORMAL
MONOCYTES # BLD AUTO: 0.65 K/UL
MONOCYTES NFR BLD AUTO: 7.6 %
NEUTROPHILS # BLD AUTO: 5.78 K/UL
NEUTROPHILS NFR BLD AUTO: 67.3 %
NITRITE URINE: POSITIVE
NITRITE URINE: POSITIVE
NONHDLC SERPL-MCNC: 166 MG/DL
PH URINE: 6.5
PH URINE: 6.5
PLATELET # BLD AUTO: 269 K/UL
POTASSIUM SERPL-SCNC: 4.6 MMOL/L
PROT SERPL-MCNC: 6.9 G/DL
PROTEIN URINE: ABNORMAL
PROTEIN URINE: ABNORMAL
RBC # BLD: 4.3 M/UL
RBC # FLD: 13.6 %
RED BLOOD CELLS URINE: 2 /HPF
SODIUM SERPL-SCNC: 145 MMOL/L
SPECIFIC GRAVITY URINE: 1.02
SPECIFIC GRAVITY URINE: 1.02
SQUAMOUS EPITHELIAL CELLS: 3 /HPF
TRIGL SERPL-MCNC: 172 MG/DL
TSH SERPL-ACNC: 4.36 UIU/ML
UROBILINOGEN URINE: NORMAL
UROBILINOGEN URINE: NORMAL
VIT B12 SERPL-MCNC: 1122 PG/ML
WBC # FLD AUTO: 8.59 K/UL
WHITE BLOOD CELLS URINE: 404 /HPF

## 2021-03-25 NOTE — HISTORY OF PRESENT ILLNESS
[FreeTextEntry1] : Patient presents for follow-up of chronic disease management [de-identified] : Currently feels well, denies any chest pain chest tightness, denies any falls at home undergoing physical therapy for shoulder pain and will be seeing orthopedic surgery for chronic knee pain.  Denies any abdominal pain jaundice fevers chills.

## 2021-03-25 NOTE — ASSESSMENT
[FreeTextEntry1] : Repeat LFTs today check lipid panel through blood work blood pressure stable, vitamin D levels vitamin B12 levels to be assessed continue physical therapy for OA of the knee will also follow-up with orthopedic surgery.

## 2021-03-25 NOTE — PHYSICAL EXAM
[Normal] : affect was normal and insight and judgment were intact [de-identified] : Bilateral mastectomy

## 2021-04-12 ENCOUNTER — NON-APPOINTMENT (OUTPATIENT)
Age: 86
End: 2021-04-12

## 2021-06-07 DIAGNOSIS — M17.11 UNILATERAL PRIMARY OSTEOARTHRITIS, RIGHT KNEE: ICD-10-CM

## 2021-09-15 NOTE — OCCUPATIONAL THERAPY INITIAL EVALUATION ADULT - MANUAL MUSCLE TESTING RESULTS, REHAB EVAL
yes grossly assessed due to/RUE:  strength grossly 3+/5, remaining RUE not tested, LUE/BLE: at least 3+/5

## 2021-10-22 ENCOUNTER — NON-APPOINTMENT (OUTPATIENT)
Age: 86
End: 2021-10-22

## 2021-11-08 NOTE — OCCUPATIONAL THERAPY INITIAL EVALUATION ADULT - LIVES WITH, PROFILE
Writer contacted  [facility] as requested below  and was on hold for 10 minutes. Staff member stated Catherine Rasheed was unavailable at this time and she will try back later.   alone Pt lives alone in [place], 6-7 steps to enter with L rail, 3 bedroom/2 bathrooms on main level. +walkin shower off master bedroom no grab bars, tub shower with grab bars in guest bath, 12 steps to descend to lower level with L rail for laundry and in home entrance to garage/alone

## 2021-11-22 ENCOUNTER — LABORATORY RESULT (OUTPATIENT)
Age: 86
End: 2021-11-22

## 2021-11-22 ENCOUNTER — APPOINTMENT (OUTPATIENT)
Dept: INTERNAL MEDICINE | Facility: CLINIC | Age: 86
End: 2021-11-22
Payer: MEDICARE

## 2021-11-22 VITALS
SYSTOLIC BLOOD PRESSURE: 116 MMHG | BODY MASS INDEX: 24.69 KG/M2 | WEIGHT: 117.61 LBS | DIASTOLIC BLOOD PRESSURE: 67 MMHG | TEMPERATURE: 96.8 F | HEIGHT: 57.87 IN | HEART RATE: 81 BPM | OXYGEN SATURATION: 98 %

## 2021-11-22 DIAGNOSIS — I10 ESSENTIAL (PRIMARY) HYPERTENSION: ICD-10-CM

## 2021-11-22 DIAGNOSIS — E53.8 DEFICIENCY OF OTHER SPECIFIED B GROUP VITAMINS: ICD-10-CM

## 2021-11-22 DIAGNOSIS — E78.5 HYPERLIPIDEMIA, UNSPECIFIED: ICD-10-CM

## 2021-11-22 DIAGNOSIS — R79.89 OTHER SPECIFIED ABNORMAL FINDINGS OF BLOOD CHEMISTRY: ICD-10-CM

## 2021-11-22 PROCEDURE — G0008: CPT

## 2021-11-22 PROCEDURE — 90686 IIV4 VACC NO PRSV 0.5 ML IM: CPT

## 2021-11-22 PROCEDURE — G0439: CPT

## 2021-11-22 PROCEDURE — 36415 COLL VENOUS BLD VENIPUNCTURE: CPT

## 2021-11-22 PROCEDURE — 99213 OFFICE O/P EST LOW 20 MIN: CPT | Mod: 25

## 2021-11-23 NOTE — HEALTH RISK ASSESSMENT
[Good] : ~his/her~  mood as  good [Intercurrent hospitalizations] : was admitted to the hospital  [No] : No [Any fall with injury in past year] : Patient reported fall with injury in the past year [0] : 2) Feeling down, depressed, or hopeless: Not at all (0) [Transportation] : transportation [Alone] : lives alone [Feels Safe at Home] : Feels safe at home [Fully functional (bathing, dressing, toileting, transferring, walking, feeding)] : Fully functional (bathing, dressing, toileting, transferring, walking, feeding) [Independent] : managing finances [Some assistance needed] : shopping [Full assistance needed] : using transportation [Reports normal functional visual acuity (ie: able to read med bottle)] : Reports normal functional visual acuity [Smoke Detector] : smoke detector [Carbon Monoxide Detector] : carbon monoxide detector [Seat Belt] :  uses seat belt [Sunscreen] : uses sunscreen [] : No [de-identified] : fall and fracture [de-identified] : none [Aicha] : 9 seconds [Change in mental status noted] : No change in mental status noted [Language] : denies difficulty with language [Behavior] : denies difficulty with behavior [Learning/Retaining New Information] : denies difficulty learning/retaining new information [Handling Complex Tasks] : denies difficulty handling complex tasks [Reasoning] : denies difficulty with reasoning [Spatial Ability and Orientation] : denies difficulty with spatial ability and orientation [Reports changes in hearing] : Reports no changes in hearing [Reports changes in vision] : Reports no changes in vision [Reports changes in dental health] : Reports no changes in dental health [Guns at Home] : no guns at home [Travel to Developing Areas] : does not  travel to developing areas [TB Exposure] : is not being exposed to tuberculosis [Caregiver Concerns] : does not have caregiver concerns [AdvancecareDate] : 11/21

## 2021-11-23 NOTE — HISTORY OF PRESENT ILLNESS
[FreeTextEntry1] : Patient presents for subsequent Medicare annual wellness visit and chronic disease management [de-identified] : Feels well overall\par Denies any chest pain chest tightness shortness of breath\par Has some right upper quadrant tightening related to food\par Denies any nausea vomiting diarrhea\par Denies any epigastric discomfort\par Denies any falls at home\par PT helping with knee pain

## 2021-11-23 NOTE — PHYSICAL EXAM
[Normal] : normal gait, coordination grossly intact, no focal deficits and deep tendon reflexes were 2+ and symmetric [de-identified] : bilateral mastectomy

## 2021-11-23 NOTE — ASSESSMENT
[FreeTextEntry1] : Blood work done today\par Blood pressure stable\par Check B12 levels patient compliant with medication\par Continue with PT for shoulder knee pain\par Influenza administered\par Continue to monitor LFTs\par Do not suspect patient symptoms are secondary to gallstones however patient develops any new or symptoms to call office immediately patient verbalized understanding

## 2021-11-27 LAB
25(OH)D3 SERPL-MCNC: 39.1 NG/ML
ALBUMIN SERPL ELPH-MCNC: 4.8 G/DL
ALP BLD-CCNC: 93 U/L
ALT SERPL-CCNC: 120 U/L
ANION GAP SERPL CALC-SCNC: 17 MMOL/L
APPEARANCE: ABNORMAL
APPEARANCE: ABNORMAL
AST SERPL-CCNC: 85 U/L
BACTERIA: ABNORMAL
BASOPHILS # BLD AUTO: 0.06 K/UL
BASOPHILS NFR BLD AUTO: 0.7 %
BILIRUB SERPL-MCNC: 0.5 MG/DL
BILIRUBIN URINE: NEGATIVE
BILIRUBIN URINE: NEGATIVE
BLOOD URINE: NORMAL
BLOOD URINE: NORMAL
BUN SERPL-MCNC: 20 MG/DL
CALCIUM SERPL-MCNC: 10.3 MG/DL
CHLORIDE SERPL-SCNC: 102 MMOL/L
CHOLEST SERPL-MCNC: 237 MG/DL
CO2 SERPL-SCNC: 23 MMOL/L
COLOR: YELLOW
COLOR: YELLOW
CREAT SERPL-MCNC: 1.07 MG/DL
EOSINOPHIL # BLD AUTO: 0.1 K/UL
EOSINOPHIL NFR BLD AUTO: 1.1 %
ESTIMATED AVERAGE GLUCOSE: 108 MG/DL
GLUCOSE QUALITATIVE U: NEGATIVE
GLUCOSE QUALITATIVE U: NEGATIVE
GLUCOSE SERPL-MCNC: 105 MG/DL
HBA1C MFR BLD HPLC: 5.4 %
HCT VFR BLD CALC: 45.1 %
HDLC SERPL-MCNC: 68 MG/DL
HGB BLD-MCNC: 14.6 G/DL
HYALINE CASTS: 3 /LPF
IMM GRANULOCYTES NFR BLD AUTO: 0.3 %
KETONES URINE: NEGATIVE
KETONES URINE: NEGATIVE
LDLC SERPL CALC-MCNC: 131 MG/DL
LEUKOCYTE ESTERASE URINE: ABNORMAL
LEUKOCYTE ESTERASE URINE: ABNORMAL
LYMPHOCYTES # BLD AUTO: 2.18 K/UL
LYMPHOCYTES NFR BLD AUTO: 24.6 %
MAN DIFF?: NORMAL
MCHC RBC-ENTMCNC: 31.5 PG
MCHC RBC-ENTMCNC: 32.4 GM/DL
MCV RBC AUTO: 97.2 FL
MICROSCOPIC-UA: NORMAL
MONOCYTES # BLD AUTO: 0.57 K/UL
MONOCYTES NFR BLD AUTO: 6.4 %
NEUTROPHILS # BLD AUTO: 5.91 K/UL
NEUTROPHILS NFR BLD AUTO: 66.9 %
NITRITE URINE: POSITIVE
NITRITE URINE: POSITIVE
NONHDLC SERPL-MCNC: 168 MG/DL
PH URINE: 7
PH URINE: 7
PLATELET # BLD AUTO: 298 K/UL
POTASSIUM SERPL-SCNC: 4.6 MMOL/L
PROT SERPL-MCNC: 7 G/DL
PROTEIN URINE: NORMAL
PROTEIN URINE: NORMAL
RBC # BLD: 4.64 M/UL
RBC # FLD: 13.3 %
RED BLOOD CELLS URINE: 6 /HPF
SODIUM SERPL-SCNC: 142 MMOL/L
SPECIFIC GRAVITY URINE: 1.02
SPECIFIC GRAVITY URINE: 1.02
SQUAMOUS EPITHELIAL CELLS: 2 /HPF
TRIGL SERPL-MCNC: 189 MG/DL
TRIPLE PHOSPHATE CRYSTALS: ABNORMAL
TSH SERPL-ACNC: 3.12 UIU/ML
UROBILINOGEN URINE: NORMAL
UROBILINOGEN URINE: NORMAL
VIT B12 SERPL-MCNC: 1875 PG/ML
WBC # FLD AUTO: 8.85 K/UL
WHITE BLOOD CELLS URINE: 195 /HPF

## 2022-01-04 DIAGNOSIS — M19.019 PRIMARY OSTEOARTHRITIS, UNSPECIFIED SHOULDER: ICD-10-CM

## 2022-01-05 ENCOUNTER — RX RENEWAL (OUTPATIENT)
Age: 87
End: 2022-01-05

## 2022-01-11 DIAGNOSIS — M25.512 PAIN IN LEFT SHOULDER: ICD-10-CM

## 2022-03-28 ENCOUNTER — RX RENEWAL (OUTPATIENT)
Age: 87
End: 2022-03-28

## 2022-06-21 ENCOUNTER — RX RENEWAL (OUTPATIENT)
Age: 87
End: 2022-06-21

## 2022-08-08 ENCOUNTER — NON-APPOINTMENT (OUTPATIENT)
Age: 87
End: 2022-08-08

## 2022-10-07 ENCOUNTER — RX RENEWAL (OUTPATIENT)
Age: 87
End: 2022-10-07

## 2022-12-28 ENCOUNTER — NON-APPOINTMENT (OUTPATIENT)
Age: 87
End: 2022-12-28

## 2022-12-28 ENCOUNTER — APPOINTMENT (OUTPATIENT)
Dept: INTERNAL MEDICINE | Facility: CLINIC | Age: 87
End: 2022-12-28
Payer: MEDICARE

## 2022-12-28 VITALS
TEMPERATURE: 99.1 F | HEART RATE: 92 BPM | WEIGHT: 118 LBS | DIASTOLIC BLOOD PRESSURE: 66 MMHG | HEIGHT: 57 IN | BODY MASS INDEX: 25.46 KG/M2 | RESPIRATION RATE: 17 BRPM | SYSTOLIC BLOOD PRESSURE: 106 MMHG | OXYGEN SATURATION: 96 %

## 2022-12-28 DIAGNOSIS — R41.3 OTHER AMNESIA: ICD-10-CM

## 2022-12-28 DIAGNOSIS — R26.81 UNSTEADINESS ON FEET: ICD-10-CM

## 2022-12-28 DIAGNOSIS — Z00.00 ENCOUNTER FOR GENERAL ADULT MEDICAL EXAMINATION W/OUT ABNORMAL FINDINGS: ICD-10-CM

## 2022-12-28 PROCEDURE — G0008: CPT

## 2022-12-28 PROCEDURE — 36415 COLL VENOUS BLD VENIPUNCTURE: CPT

## 2022-12-28 PROCEDURE — G0439: CPT

## 2022-12-28 PROCEDURE — 90662 IIV NO PRSV INCREASED AG IM: CPT

## 2022-12-28 PROCEDURE — 99214 OFFICE O/P EST MOD 30 MIN: CPT | Mod: 25

## 2022-12-29 NOTE — ASSESSMENT
[FreeTextEntry1] : Annual Wellness Visit\par -Blood work done today\par -BP is stable. Continue current management\par -Check A1c, lipid panel and Vitamin levels\par -Gait instability maybe secondary to knee pain. Given the TO have MRI of brain given the dizziness and gait instability To assess for any space occupying lesion.\par -Influenza vaccine administered today. Pt tolerated the procedure well.\par -RTO in 3 months.

## 2022-12-29 NOTE — PHYSICAL EXAM
[No Acute Distress] : no acute distress [Well Nourished] : well nourished [Well Developed] : well developed [Well-Appearing] : well-appearing [Normal Sclera/Conjunctiva] : normal sclera/conjunctiva [PERRL] : pupils equal round and reactive to light [EOMI] : extraocular movements intact [Normal Outer Ear/Nose] : the outer ears and nose were normal in appearance [Normal Oropharynx] : the oropharynx was normal [No JVD] : no jugular venous distention [No Lymphadenopathy] : no lymphadenopathy [Supple] : supple [Thyroid Normal, No Nodules] : the thyroid was normal and there were no nodules present [No Respiratory Distress] : no respiratory distress  [No Accessory Muscle Use] : no accessory muscle use [Clear to Auscultation] : lungs were clear to auscultation bilaterally [Normal Rate] : normal rate  [Regular Rhythm] : with a regular rhythm [Normal S1, S2] : normal S1 and S2 [No Murmur] : no murmur heard [No Carotid Bruits] : no carotid bruits [No Abdominal Bruit] : a ~M bruit was not heard ~T in the abdomen [No Varicosities] : no varicosities [Pedal Pulses Present] : the pedal pulses are present [No Edema] : there was no peripheral edema [No Palpable Aorta] : no palpable aorta [No Extremity Clubbing/Cyanosis] : no extremity clubbing/cyanosis [Soft] : abdomen soft [Non Tender] : non-tender [Non-distended] : non-distended [No Masses] : no abdominal mass palpated [No HSM] : no HSM [Normal Bowel Sounds] : normal bowel sounds [Normal Posterior Cervical Nodes] : no posterior cervical lymphadenopathy [Normal Anterior Cervical Nodes] : no anterior cervical lymphadenopathy [No CVA Tenderness] : no CVA  tenderness [No Spinal Tenderness] : no spinal tenderness [No Joint Swelling] : no joint swelling [Grossly Normal Strength/Tone] : grossly normal strength/tone [No Rash] : no rash [Coordination Grossly Intact] : coordination grossly intact [No Focal Deficits] : no focal deficits [Deep Tendon Reflexes (DTR)] : deep tendon reflexes were 2+ and symmetric [Normal Affect] : the affect was normal [Normal Insight/Judgement] : insight and judgment were intact [de-identified] : 4/5 LE strength [de-identified] : Shuffling gait

## 2022-12-29 NOTE — HEALTH RISK ASSESSMENT
[Good] : ~his/her~  mood as  good [Intercurrent hospitalizations] : was admitted to the hospital  [Transportation] : transportation [Alone] : lives alone [Feels Safe at Home] : Feels safe at home [Fully functional (bathing, dressing, toileting, transferring, walking, feeding)] : Fully functional (bathing, dressing, toileting, transferring, walking, feeding) [Independent] : managing finances [Some assistance needed] : shopping [Full assistance needed] : using transportation [Reports normal functional visual acuity (ie: able to read med bottle)] : Reports normal functional visual acuity [Smoke Detector] : smoke detector [Carbon Monoxide Detector] : carbon monoxide detector [Seat Belt] :  uses seat belt [Sunscreen] : uses sunscreen [No] : In the past 12 months have you used drugs other than those required for medical reasons? No [No falls in past year] : Patient reported no falls in the past year [0] : 1) Little interest or pleasure doing things: Not at all (0) [1] : 2) Feeling down, depressed, or hopeless for several days (1) [FreeTextEntry1] : Health Maintenance [de-identified] : none [Change in mental status noted] : No change in mental status noted [Language] : denies difficulty with language [Behavior] : denies difficulty with behavior [Learning/Retaining New Information] : denies difficulty learning/retaining new information [Handling Complex Tasks] : denies difficulty handling complex tasks [Reasoning] : denies difficulty with reasoning [Spatial Ability and Orientation] : denies difficulty with spatial ability and orientation [Reports changes in hearing] : Reports no changes in hearing [Reports changes in vision] : Reports no changes in vision [Reports changes in dental health] : Reports no changes in dental health [Guns at Home] : no guns at home [Travel to Developing Areas] : does not  travel to developing areas [TB Exposure] : is not being exposed to tuberculosis [Caregiver Concerns] : does not have caregiver concerns

## 2022-12-29 NOTE — ADDENDUM
[FreeTextEntry1] : I, Giorgi Padron, acted as a scribe on behalf of Dr. Mick Walker MD, on 12/28/2022. \par \par All medical entries made by the scribe were at my, Dr. Mick Walker MD, direction and personally dictated by me on 12/28/2022. I have reviewed the chart and agree that the record accurately reflects my personal performance of the history, physical exam, assessment and plan. I have also personally directed, reviewed, and agreed with the chart.

## 2022-12-29 NOTE — HISTORY OF PRESENT ILLNESS
[Family Member] : family member [FreeTextEntry1] : Patient presents for annual wellness visit. [de-identified] : Patient presents to office with Daughter-in-law, who is concerned about Pt's increased gait instability. \par States it has worsened in the last 2-3 days.\par Pt notes she has knee pain and  dizziness.\par Denies any palpitations, SOB, CP, urinary issues\par Denies any neck pain or back pain.\par \par Pt does feel slightly more depressed as she is concerned she will be placed in a living facility.\par Interested in flu shot.

## 2023-01-03 LAB
25(OH)D3 SERPL-MCNC: 51.7 NG/ML
ALBUMIN SERPL ELPH-MCNC: 4.2 G/DL
ALP BLD-CCNC: 99 U/L
ALT SERPL-CCNC: 134 U/L
ANION GAP SERPL CALC-SCNC: 15 MMOL/L
APPEARANCE: ABNORMAL
AST SERPL-CCNC: 157 U/L
BACTERIA: ABNORMAL
BASOPHILS # BLD AUTO: 0.05 K/UL
BASOPHILS NFR BLD AUTO: 0.5 %
BILIRUB SERPL-MCNC: 0.9 MG/DL
BILIRUBIN URINE: NEGATIVE
BLOOD URINE: NEGATIVE
BUN SERPL-MCNC: 24 MG/DL
CALCIUM SERPL-MCNC: 9.1 MG/DL
CHLORIDE SERPL-SCNC: 99 MMOL/L
CHOLEST SERPL-MCNC: 178 MG/DL
CO2 SERPL-SCNC: 24 MMOL/L
COLOR: YELLOW
CREAT SERPL-MCNC: 1.05 MG/DL
EGFR: 49 ML/MIN/1.73M2
EOSINOPHIL # BLD AUTO: 0.01 K/UL
EOSINOPHIL NFR BLD AUTO: 0.1 %
ESTIMATED AVERAGE GLUCOSE: 108 MG/DL
FOLATE SERPL-MCNC: 18.4 NG/ML
GLUCOSE QUALITATIVE U: NEGATIVE
GLUCOSE SERPL-MCNC: 118 MG/DL
HBA1C MFR BLD HPLC: 5.4 %
HCT VFR BLD CALC: 40.4 %
HDLC SERPL-MCNC: 69 MG/DL
HGB BLD-MCNC: 13.6 G/DL
HYALINE CASTS: 1 /LPF
IMM GRANULOCYTES NFR BLD AUTO: 0.4 %
KETONES URINE: NEGATIVE
LDLC SERPL CALC-MCNC: 87 MG/DL
LEUKOCYTE ESTERASE URINE: ABNORMAL
LYMPHOCYTES # BLD AUTO: 0.65 K/UL
LYMPHOCYTES NFR BLD AUTO: 6.9 %
MAN DIFF?: NORMAL
MCHC RBC-ENTMCNC: 32.5 PG
MCHC RBC-ENTMCNC: 33.7 GM/DL
MCV RBC AUTO: 96.7 FL
MICROSCOPIC-UA: NORMAL
MONOCYTES # BLD AUTO: 0.88 K/UL
MONOCYTES NFR BLD AUTO: 9.4 %
NEUTROPHILS # BLD AUTO: 7.76 K/UL
NEUTROPHILS NFR BLD AUTO: 82.7 %
NITRITE URINE: POSITIVE
NONHDLC SERPL-MCNC: 108 MG/DL
PH URINE: 8.5
PLATELET # BLD AUTO: 149 K/UL
POTASSIUM SERPL-SCNC: 4.3 MMOL/L
PROT SERPL-MCNC: 6.6 G/DL
PROTEIN URINE: ABNORMAL
RBC # BLD: 4.18 M/UL
RBC # FLD: 14.5 %
RED BLOOD CELLS URINE: 9 /HPF
SODIUM SERPL-SCNC: 138 MMOL/L
SPECIFIC GRAVITY URINE: 1.02
SQUAMOUS EPITHELIAL CELLS: 1 /HPF
TRIGL SERPL-MCNC: 106 MG/DL
TSH SERPL-ACNC: 0.85 UIU/ML
UROBILINOGEN URINE: NORMAL
VIT B12 SERPL-MCNC: 1892 PG/ML
WBC # FLD AUTO: 9.39 K/UL
WHITE BLOOD CELLS URINE: 0 /HPF

## 2023-01-08 ENCOUNTER — RX RENEWAL (OUTPATIENT)
Age: 88
End: 2023-01-08

## 2023-01-11 ENCOUNTER — RX RENEWAL (OUTPATIENT)
Age: 88
End: 2023-01-11

## 2023-03-13 ENCOUNTER — NON-APPOINTMENT (OUTPATIENT)
Age: 88
End: 2023-03-13

## 2023-04-05 ENCOUNTER — RX RENEWAL (OUTPATIENT)
Age: 88
End: 2023-04-05

## 2023-04-07 ENCOUNTER — RX RENEWAL (OUTPATIENT)
Age: 88
End: 2023-04-07

## 2023-04-07 RX ORDER — TRIAMTERENE AND HYDROCHLOROTHIAZIDE 37.5; 25 MG/1; MG/1
37.5-25 CAPSULE ORAL
Qty: 90 | Refills: 0 | Status: ACTIVE | COMMUNITY
Start: 2020-09-14 | End: 1900-01-01

## 2023-05-08 NOTE — DISCHARGE NOTE PROVIDER - CARE PROVIDER_API CALL
Griffin Potter)  Orthopaedic Surgery  [address]  [place] [address]  Phone: [phone]  Fax: [phone]  Follow Up Time: 1 week
I have personally seen and examined the patient. I have collaborated with and supervised the

## 2023-06-19 ENCOUNTER — RX RENEWAL (OUTPATIENT)
Age: 88
End: 2023-06-19

## 2024-01-02 ENCOUNTER — RX RENEWAL (OUTPATIENT)
Age: 89
End: 2024-01-02

## 2024-01-03 ENCOUNTER — APPOINTMENT (OUTPATIENT)
Dept: INTERNAL MEDICINE | Facility: CLINIC | Age: 89
End: 2024-01-03

## 2024-07-05 ENCOUNTER — RX RENEWAL (OUTPATIENT)
Age: 89
End: 2024-07-05

## 2024-07-23 NOTE — OCCUPATIONAL THERAPY INITIAL EVALUATION ADULT - LONG TERM MEMORY, REHAB EVAL
[Hypothyroidism] : hypothyroidism [Weight Management/Obesity] : weight management/obesity [Follow - Up] : a follow-up visit [Home] : at home, [unfilled] , at the time of the visit. [Medical Office: ([facility])___] : at the medical office located in  [Verbal consent obtained from patient] : the patient, [unfilled] 3/3 recall/intact

## 2024-08-06 ENCOUNTER — TRANSCRIPTION ENCOUNTER (OUTPATIENT)
Age: 89
End: 2024-08-06

## 2024-08-08 ENCOUNTER — RESULT REVIEW (OUTPATIENT)
Age: 89
End: 2024-08-08

## 2024-08-13 ENCOUNTER — TRANSCRIPTION ENCOUNTER (OUTPATIENT)
Age: 89
End: 2024-08-13

## 2024-08-16 NOTE — H&P ADULT - DOES THIS PATIENT HAVE A HISTORY OF OR HAS BEEN DX WITH HEART FAILURE?
Dtr reports urine cloudy and very strong odor again. Remains taking Keflex but they are thinking it's not working.     Dr. Reese please advise.   unknown